# Patient Record
Sex: FEMALE | Race: BLACK OR AFRICAN AMERICAN | NOT HISPANIC OR LATINO | Employment: FULL TIME | URBAN - METROPOLITAN AREA
[De-identification: names, ages, dates, MRNs, and addresses within clinical notes are randomized per-mention and may not be internally consistent; named-entity substitution may affect disease eponyms.]

---

## 2018-03-12 ENCOUNTER — OFFICE VISIT (OUTPATIENT)
Dept: FAMILY MEDICINE | Facility: CLINIC | Age: 38
End: 2018-03-12
Payer: COMMERCIAL

## 2018-03-12 VITALS
TEMPERATURE: 99 F | OXYGEN SATURATION: 99 % | HEART RATE: 85 BPM | SYSTOLIC BLOOD PRESSURE: 132 MMHG | RESPIRATION RATE: 18 BRPM | DIASTOLIC BLOOD PRESSURE: 88 MMHG | BODY MASS INDEX: 47.09 KG/M2 | WEIGHT: 293 LBS | HEIGHT: 66 IN

## 2018-03-12 DIAGNOSIS — D50.9 IRON DEFICIENCY ANEMIA, UNSPECIFIED IRON DEFICIENCY ANEMIA TYPE: ICD-10-CM

## 2018-03-12 DIAGNOSIS — E66.01 MORBID OBESITY WITH BMI OF 60.0-69.9, ADULT: ICD-10-CM

## 2018-03-12 DIAGNOSIS — E88.810 INSULIN RESISTANCE SYNDROME: ICD-10-CM

## 2018-03-12 DIAGNOSIS — I10 ESSENTIAL HYPERTENSION: ICD-10-CM

## 2018-03-12 DIAGNOSIS — E55.9 VITAMIN D DEFICIENCY: ICD-10-CM

## 2018-03-12 PROCEDURE — 99999 PR PBB SHADOW E&M-EST. PATIENT-LVL III: CPT | Mod: PBBFAC,,, | Performed by: FAMILY MEDICINE

## 2018-03-12 PROCEDURE — 99214 OFFICE O/P EST MOD 30 MIN: CPT | Mod: S$GLB,,, | Performed by: FAMILY MEDICINE

## 2018-03-12 PROCEDURE — 3075F SYST BP GE 130 - 139MM HG: CPT | Mod: CPTII,S$GLB,, | Performed by: FAMILY MEDICINE

## 2018-03-12 PROCEDURE — 3079F DIAST BP 80-89 MM HG: CPT | Mod: CPTII,S$GLB,, | Performed by: FAMILY MEDICINE

## 2018-03-12 RX ORDER — LOSARTAN POTASSIUM AND HYDROCHLOROTHIAZIDE 12.5; 5 MG/1; MG/1
1 TABLET ORAL DAILY
Qty: 90 TABLET | Refills: 1 | Status: SHIPPED | OUTPATIENT
Start: 2018-03-12 | End: 2019-01-21 | Stop reason: SDUPTHER

## 2018-03-12 RX ORDER — CYCLOBENZAPRINE HCL 10 MG
10 TABLET ORAL NIGHTLY PRN
Qty: 30 TABLET | Refills: 1 | Status: SHIPPED | OUTPATIENT
Start: 2018-03-12 | End: 2019-01-21

## 2018-03-12 RX ORDER — CHOLECALCIFEROL (VITAMIN D3) 25 MCG
185 TABLET ORAL
COMMUNITY

## 2018-03-12 RX ORDER — MELOXICAM 15 MG/1
15 TABLET ORAL DAILY
Qty: 30 TABLET | Refills: 1 | Status: SHIPPED | OUTPATIENT
Start: 2018-03-12 | End: 2019-01-21

## 2018-03-12 NOTE — PROGRESS NOTES
Subjective:       Patient ID: Aggie Horton is a 37 y.o. female.    Chief Complaint: Chest Pain and Medication Refill    Ms. Horton comes in today to establish care with me.  She states she changes PCP from Dr. Stewart to me.  Her last visit with Dr. Stewart was on October 12, 2016.    She states she has not taken blood pressure medication since last month.  She does not exercise and does not monitor her diet.    She states she was unable to tolerate metformin XR due to severe diarrhea, nausea.  She also states Victoza was never given as will require prior authorization.  She states she never got to try low-dose Januvia.  She denies having known family history of thyroid cancer neoplastic syndrome.    She reports having intermittent palpitations for several months but reports more frequently over the past 2 weeks.  She consumes caffeine daily and denies use of over-the-counter sinus decongestant medication.  She denies having associated chest pain, leg swelling, shortness of breath, cough, wheezing, abdominal pain, nausea, vomiting, diarrhea, constipation, fever, chills, fatigue, change of appetite, unusual urinary symptoms, back pain, headaches, anxiety, depression, homicidal or suicidal thoughts.    She states for 1 week she has been experiencing left ankle pain with pain at her second and third toes stepping.  She denies associated trauma but states she has had similar symptoms in the past and was prescribed Mobic, Flexeril with help.  She also reports having occasional neck pain without associated trauma and reports having similar symptoms in the past.    She is not fasting today.    She does not desire flu shot.      Past Medical History:  No date: Hypertension  No date: Insulin resistance  No date: KELLY on CPAP    Current Outpatient Prescriptions:  FERROUS GLUCONATE ORAL, Take 325 mg by mouth.  vitamin D 1000 units Tab, Take 185 mg by mouth.  cyclobenzaprine (FLEXERIL) 10 MG tablet, Take 1  tablet (10 mg total) by mouth nightly as needed for Muscle spasms (not currently taking)..  liraglutide 0.6 mg/0.1 mL, 18 mg/3 mL, subq PNIJ 0.6 mg/0.1 mL (18 mg/3 mL) PnIj, Inject 1.2 mg into the skin once daily (not currently taking).  losartan-hydrochlorothiazide 50-12.5 mg (HYZAAR) 50-12.5 mg per tablet, Take 1 tablet by mouth once daily (not currently taking)..  meloxicam (MOBIC) 15 MG tablet, Take 1 tablet (15 mg total) by mouth once daily (not currently taking).            Chest Pain    Associated symptoms include headaches and palpitations. Pertinent negatives include no cough, fever, shortness of breath, vomiting or weakness.   Medication Refill   Associated symptoms include headaches, myalgias and neck pain. Pertinent negatives include no arthralgias, chest pain, chills, coughing, fatigue, fever, joint swelling, vomiting or weakness.     Review of Systems   Constitutional: Negative for activity change, appetite change, chills, fatigue, fever and unexpected weight change.   HENT: Negative for hearing loss, rhinorrhea and trouble swallowing.    Eyes: Negative for discharge and visual disturbance.   Respiratory: Negative for cough, chest tightness, shortness of breath and wheezing.    Cardiovascular: Positive for palpitations. Negative for chest pain and leg swelling.   Gastrointestinal: Negative for blood in stool, constipation, diarrhea and vomiting.   Endocrine: Negative for polydipsia and polyuria.   Genitourinary: Negative for difficulty urinating, dysuria, hematuria and menstrual problem.   Musculoskeletal: Positive for myalgias and neck pain. Negative for arthralgias and joint swelling.   Neurological: Positive for headaches. Negative for weakness.   Psychiatric/Behavioral: Negative for confusion, dysphoric mood and suicidal ideas. The patient is not nervous/anxious.        Objective:      Physical Exam   Constitutional: She is oriented to person, place, and time. She appears well-developed and  well-nourished. No distress.   Pleasant.   Neck: Normal range of motion. Neck supple. No thyromegaly present.   Cardiovascular: Normal rate, regular rhythm, normal heart sounds and intact distal pulses.    No murmur heard.  Pulmonary/Chest: Effort normal and breath sounds normal. No respiratory distress. She has no wheezes. She has no rales.   Abdominal: Soft. Bowel sounds are normal. She exhibits no distension and no mass. There is no tenderness. There is no rebound and no guarding.   Musculoskeletal: Normal range of motion. She exhibits no edema or tenderness.   She is ambulatory without problems.   Lymphadenopathy:     She has no cervical adenopathy.   Neurological: She is oriented to person, place, and time.   Skin: She is not diaphoretic.   Psychiatric: She has a normal mood and affect. Her behavior is normal. Judgment and thought content normal.   Vitals reviewed.      Assessment:       1. Essential hypertension    2. Iron deficiency anemia, unspecified iron deficiency anemia type    3. Vitamin D deficiency    4. Insulin resistance syndrome    5. Morbid obesity with BMI of 60.0-69.9, adult        Plan:       Labs: A1c, insulin, lipid panel, TSH, CMP, CBC, urinalysis, vitamin D, ferritin.  Patient advised to call for results/follow up recommendations.  Continue current medications, follow low-sodium, low-cholesterol, low carbohydrate diet, daily walks as tolerated.

## 2018-03-13 ENCOUNTER — TELEPHONE (OUTPATIENT)
Dept: FAMILY MEDICINE | Facility: CLINIC | Age: 38
End: 2018-03-13

## 2018-03-13 NOTE — TELEPHONE ENCOUNTER
----- Message from Ivette Singh sent at 3/13/2018  8:39 AM CDT -----  Contact: patient  States she need orders to have lab work done @[facility]. Please call patient ASAP @ [phone]. Thanks, sherman

## 2018-03-16 LAB
25(OH)D3+25(OH)D2 SERPL-MCNC: 34.7 NG/ML (ref 30–100)
ALBUMIN SERPL-MCNC: 4.2 G/DL (ref 3.5–5.5)
ALBUMIN/GLOB SERPL: 1.5 {RATIO} (ref 1.2–2.2)
ALP SERPL-CCNC: 66 IU/L (ref 39–117)
ALT SERPL-CCNC: 17 IU/L (ref 0–32)
APPEARANCE UR: CLEAR
AST SERPL-CCNC: 17 IU/L (ref 0–40)
BASOPHILS # BLD AUTO: 0.1 X10E3/UL (ref 0–0.2)
BASOPHILS NFR BLD AUTO: 1 %
BILIRUB SERPL-MCNC: 0.4 MG/DL (ref 0–1.2)
BILIRUB UR QL STRIP: NEGATIVE
BUN SERPL-MCNC: 12 MG/DL (ref 6–20)
BUN/CREAT SERPL: 15 (ref 9–23)
CALCIUM SERPL-MCNC: 9.7 MG/DL (ref 8.7–10.2)
CHLORIDE SERPL-SCNC: 98 MMOL/L (ref 96–106)
CHOLEST SERPL-MCNC: 184 MG/DL (ref 100–199)
CO2 SERPL-SCNC: 26 MMOL/L (ref 18–29)
COLOR UR: YELLOW
CREAT SERPL-MCNC: 0.82 MG/DL (ref 0.57–1)
EOSINOPHIL # BLD AUTO: 0.2 X10E3/UL (ref 0–0.4)
EOSINOPHIL NFR BLD AUTO: 2 %
ERYTHROCYTE [DISTWIDTH] IN BLOOD BY AUTOMATED COUNT: 15.4 % (ref 12.3–15.4)
FERRITIN SERPL-MCNC: 126 NG/ML (ref 15–150)
GFR SERPLBLD CREATININE-BSD FMLA CKD-EPI: 106 ML/MIN/1.73
GFR SERPLBLD CREATININE-BSD FMLA CKD-EPI: 92 ML/MIN/1.73
GLOBULIN SER CALC-MCNC: 2.8 G/DL (ref 1.5–4.5)
GLUCOSE SERPL-MCNC: 104 MG/DL (ref 65–99)
GLUCOSE UR QL: NEGATIVE
HBA1C MFR BLD: 5.8 % (ref 4.8–5.6)
HCT VFR BLD AUTO: 38.1 % (ref 34–46.6)
HDLC SERPL-MCNC: 50 MG/DL
HGB BLD-MCNC: 11.8 G/DL (ref 11.1–15.9)
HGB UR QL STRIP: NEGATIVE
IMM GRANULOCYTES # BLD: 0 X10E3/UL (ref 0–0.1)
IMM GRANULOCYTES NFR BLD: 0 %
INSULIN SERPL-ACNC: 70.8 UIU/ML (ref 2.6–24.9)
KETONES UR QL STRIP: NEGATIVE
LDLC SERPL CALC-MCNC: 112 MG/DL (ref 0–99)
LEUKOCYTE ESTERASE UR QL STRIP: NEGATIVE
LYMPHOCYTES # BLD AUTO: 3.3 X10E3/UL (ref 0.7–3.1)
LYMPHOCYTES NFR BLD AUTO: 32 %
MCH RBC QN AUTO: 24.5 PG (ref 26.6–33)
MCHC RBC AUTO-ENTMCNC: 31 G/DL (ref 31.5–35.7)
MCV RBC AUTO: 79 FL (ref 79–97)
MICRO URNS: NORMAL
MONOCYTES # BLD AUTO: 0.6 X10E3/UL (ref 0.1–0.9)
MONOCYTES NFR BLD AUTO: 6 %
NEUTROPHILS # BLD AUTO: 6.1 X10E3/UL (ref 1.4–7)
NEUTROPHILS NFR BLD AUTO: 59 %
NITRITE UR QL STRIP: NEGATIVE
PH UR STRIP: 6 [PH] (ref 5–7.5)
PLATELET # BLD AUTO: 425 X10E3/UL (ref 150–379)
POTASSIUM SERPL-SCNC: 4.5 MMOL/L (ref 3.5–5.2)
PROT SERPL-MCNC: 7 G/DL (ref 6–8.5)
PROT UR QL STRIP: NEGATIVE
RBC # BLD AUTO: 4.82 X10E6/UL (ref 3.77–5.28)
SODIUM SERPL-SCNC: 138 MMOL/L (ref 134–144)
SP GR UR: 1.03 (ref 1–1.03)
TRIGL SERPL-MCNC: 110 MG/DL (ref 0–149)
TSH SERPL DL<=0.005 MIU/L-ACNC: 1.73 UIU/ML (ref 0.45–4.5)
UROBILINOGEN UR STRIP-MCNC: 0.2 MG/DL (ref 0.2–1)
VLDLC SERPL CALC-MCNC: 22 MG/DL (ref 5–40)
WBC # BLD AUTO: 10.3 X10E3/UL (ref 3.4–10.8)

## 2018-03-19 ENCOUNTER — TELEPHONE (OUTPATIENT)
Dept: FAMILY MEDICINE | Facility: CLINIC | Age: 38
End: 2018-03-19

## 2018-03-19 PROBLEM — E66.01 MORBID OBESITY WITH BMI OF 60.0-69.9, ADULT: Status: ACTIVE | Noted: 2018-03-19

## 2018-03-19 PROBLEM — E55.9 VITAMIN D DEFICIENCY: Status: ACTIVE | Noted: 2018-03-19

## 2018-03-19 PROBLEM — E88.810 INSULIN RESISTANCE SYNDROME: Status: ACTIVE | Noted: 2018-03-19

## 2018-03-19 NOTE — TELEPHONE ENCOUNTER
----- Message from Talon Martell sent at 3/19/2018 10:40 AM CDT -----  Contact: self [phone]  Would like to consult regarding test results. Please call back at [phone].  Md Kassi

## 2018-03-20 RX ORDER — PEN NEEDLE, DIABETIC 30 GX3/16"
NEEDLE, DISPOSABLE MISCELLANEOUS
Qty: 100 EACH | Refills: 3 | Status: SHIPPED | OUTPATIENT
Start: 2018-03-20 | End: 2021-03-23

## 2018-03-20 NOTE — TELEPHONE ENCOUNTER
I spoke w/pt - discussed either Victoza or Januvia, but of course, will need insurance approval.  Patient okay with Victoza.  I advised she will need to see me in 3 months after starting Victoza.  She verbalized understanding.

## 2018-03-20 NOTE — TELEPHONE ENCOUNTER
----- Message from Yanci Starks sent at 3/20/2018  9:20 AM CDT -----  Contact: pt  The pt request a call concerning her lab results, the pt can be reached at [phone]///[id number]

## 2018-03-26 ENCOUNTER — PATIENT MESSAGE (OUTPATIENT)
Dept: FAMILY MEDICINE | Facility: CLINIC | Age: 38
End: 2018-03-26

## 2018-06-29 ENCOUNTER — PATIENT OUTREACH (OUTPATIENT)
Dept: ADMINISTRATIVE | Facility: HOSPITAL | Age: 38
End: 2018-06-29

## 2018-08-13 NOTE — TELEPHONE ENCOUNTER
Appt made for F/u. Pt voiced understanding  
LOV:03/12/18  
Needs diabetes f/u visit w/me for June 2018.  
No answer. Lm to rtc  
No answer. Lm to rtc  
1-2 drinks

## 2019-01-21 ENCOUNTER — OFFICE VISIT (OUTPATIENT)
Dept: FAMILY MEDICINE | Facility: CLINIC | Age: 39
End: 2019-01-21
Payer: COMMERCIAL

## 2019-01-21 VITALS
WEIGHT: 293 LBS | DIASTOLIC BLOOD PRESSURE: 69 MMHG | HEART RATE: 88 BPM | SYSTOLIC BLOOD PRESSURE: 139 MMHG | TEMPERATURE: 99 F | BODY MASS INDEX: 47.09 KG/M2 | HEIGHT: 66 IN | OXYGEN SATURATION: 100 %

## 2019-01-21 DIAGNOSIS — E66.01 MORBID OBESITY WITH BMI OF 50.0-59.9, ADULT: ICD-10-CM

## 2019-01-21 DIAGNOSIS — D50.9 IRON DEFICIENCY ANEMIA, UNSPECIFIED IRON DEFICIENCY ANEMIA TYPE: ICD-10-CM

## 2019-01-21 DIAGNOSIS — M25.561 CHRONIC PAIN OF RIGHT KNEE: ICD-10-CM

## 2019-01-21 DIAGNOSIS — I10 ESSENTIAL HYPERTENSION: ICD-10-CM

## 2019-01-21 DIAGNOSIS — Z00.00 ANNUAL PHYSICAL EXAM: Primary | ICD-10-CM

## 2019-01-21 DIAGNOSIS — L81.9 HYPERPIGMENTATION OF SKIN: ICD-10-CM

## 2019-01-21 DIAGNOSIS — G89.29 CHRONIC PAIN OF RIGHT KNEE: ICD-10-CM

## 2019-01-21 DIAGNOSIS — E88.810 INSULIN RESISTANCE SYNDROME: ICD-10-CM

## 2019-01-21 DIAGNOSIS — E55.9 VITAMIN D DEFICIENCY: ICD-10-CM

## 2019-01-21 PROCEDURE — 99395 PR PREVENTIVE VISIT,EST,18-39: ICD-10-PCS | Mod: S$GLB,,, | Performed by: FAMILY MEDICINE

## 2019-01-21 PROCEDURE — 99999 PR PBB SHADOW E&M-EST. PATIENT-LVL IV: CPT | Mod: PBBFAC,,, | Performed by: FAMILY MEDICINE

## 2019-01-21 PROCEDURE — 3075F PR MOST RECENT SYSTOLIC BLOOD PRESS GE 130-139MM HG: ICD-10-PCS | Mod: CPTII,S$GLB,, | Performed by: FAMILY MEDICINE

## 2019-01-21 PROCEDURE — 3075F SYST BP GE 130 - 139MM HG: CPT | Mod: CPTII,S$GLB,, | Performed by: FAMILY MEDICINE

## 2019-01-21 PROCEDURE — 99999 PR PBB SHADOW E&M-EST. PATIENT-LVL IV: ICD-10-PCS | Mod: PBBFAC,,, | Performed by: FAMILY MEDICINE

## 2019-01-21 PROCEDURE — 3078F PR MOST RECENT DIASTOLIC BLOOD PRESSURE < 80 MM HG: ICD-10-PCS | Mod: CPTII,S$GLB,, | Performed by: FAMILY MEDICINE

## 2019-01-21 PROCEDURE — 3078F DIAST BP <80 MM HG: CPT | Mod: CPTII,S$GLB,, | Performed by: FAMILY MEDICINE

## 2019-01-21 PROCEDURE — 99395 PREV VISIT EST AGE 18-39: CPT | Mod: S$GLB,,, | Performed by: FAMILY MEDICINE

## 2019-01-21 RX ORDER — FERROUS SULFATE 325(65) MG
325 TABLET ORAL
COMMUNITY

## 2019-01-21 RX ORDER — LOSARTAN POTASSIUM AND HYDROCHLOROTHIAZIDE 12.5; 5 MG/1; MG/1
1 TABLET ORAL DAILY
Qty: 90 TABLET | Refills: 1 | Status: SHIPPED | OUTPATIENT
Start: 2019-01-21 | End: 2019-07-18 | Stop reason: SDUPTHER

## 2019-01-21 NOTE — PROGRESS NOTES
"HISTORY OF PRESENT ILLNESS:  Ms. Horton comes in today nonfasting and without taken medication for annual wellness examination.     END OF LIFE DECISION: She does not have a living will.   She does desire life support.    Current Outpatient Medications   Medication Sig    ferrous sulfate (IRON) 325 mg (65 mg iron) Tab tablet Take 325 mg by mouth daily with breakfast.    losartan-hydrochlorothiazide 50-12.5 mg (HYZAAR) 50-12.5 mg per tablet Take 1 tablet by mouth once daily.    vitamin D 1000 units Tab Take 185 mg by mouth.    liraglutide 0.6 mg/0.1 mL, 18 mg/3 mL, subq PNIJ 0.6 mg/0.1 mL (18 mg/3 mL) PnIj Inject 1.2 mg into the skin once daily. (not taking as no refill since December 2018)    pen needle, diabetic (BD ULTRA-FINE WILL PEN NEEDLES) 32 gauge x 5/32" Ndle Use daily     SCREENINGS:    Lab Results   Component Value Date    LDLCALC 112 (H) 03/15/2018      Colonoscopy 06/02/2015    Pap Smear with HPV Cotest 08/03/2018 with GYN Dr. Cadence Hernandez   Mammogram: (at age 36) - okay.  Eye Exam: Years ago per patient.  PPD: [place].      Immunizations:    Tdap: > 10 years ago per patient. She declines.  Flu shot: In the past.  She declines.  Pneumovax: Never. She declines.                                  ROS:  GENERAL: Denies fever, chills, fatigue or unusual weight change. Appetite normal. Exercises does not. Monitors diet does not.  SKIN: Denies rashes, itching, changes in mole, color or texture of skin or easy bruising.  HEAD:  Denies headaches or recent head trauma.  EYES: Denies change in vision, pain, diplopia, redness or discharge.  EARS: Denies ear pain, discharge, vertigo or decreased hearing.  NOSE: Denies loss of smell, epistaxis or rhinitis.  MOUTH & THROAT: Denies hoarseness or change in voice. Denies excessive gum bleeding or mouth sores.  Denies sore throat.  NODES: Denies swollen glands.  CHEST: Denies GALLAGHER, wheezing, cough, or sputum production.  CARDIOVASCULAR: Denies chest pain, PND, orthopnea " "or reduced exercise tolerance.  Denies palpitations.  ABDOMEN: Denies diarrhea, constipation, nausea, vomiting, abdominal pain, or blood in stool.  URINARY: Denies flank pain, dysuria or hematuria.  GENITOURINARY: Denies flank pain, dysuria, frequency or hematuria. No change in menses. Performs monthly breast exams does.  ENDOCRINE: Denies diabetes, thyroid, cholesterol problems but reports history of insulin resistance. Performs home glucose checks does not.  HEME/LYMPH: Denies bleeding problems.  PERIPHERAL VASCULAR:Denies claudication or cyanosis.  MUSCULOSKELETAL: Denies joint stiffness, pain or swelling. Denies edema. Except reports non-traumatic right knee pain, throbbing x 3 months without swelling but with constant discomfort x 2-1/2 months; takes OTC Ibuprofen and topical rubs without help; reports 6/10 at pain scale now. Ambulatory with pain but reports can't kneel on it.  NEUROLOGIC: Denies history of seizures, tremors, paralysis, alteration of gait or coordination.  PSYCHIATRIC: Denies mood swings, depression anxiety, homicidal or suicidal thoughts. Denies sleep problems.    PE:   VS: /69 (BP Location: Left arm, Patient Position: Sitting, BP Method: Large (Manual))   Pulse 88   Temp 98.8 °F (37.1 °C) (Tympanic)   Ht 5' 6" (1.676 m)   Wt (!) 168.2 kg (370 lb 14.8 oz)   LMP 01/04/2019 Comment: Monthly  SpO2 100%   BMI 59.87 kg/m²   APPEARANCE:  Well nourished, well developed female, pleasant and obese, alert and oriented in no acute distress.    HEAD: Nontender. Full range of motion.  EYES: PERRL, conjunctiva pink, lids no edema.   EARS: External canal patent with wax noted. No swelling or redness. TM's shiny and clear.  NOSE: Mucosa and turbinates pink, not swollen. No discharge. Nontender sinuses.  THROAT: No pharyngeal erythema or exudate. No stridor.   NECK: Supple, no mass, thyroid not enlarged.  NODES: No cervical lymph node enlargement.  CHEST: Normal respiratory effort. Lungs clear to " auscultation.  CARDIOVASCULAR: Normal S1, S2. No rubs, murmurs or gallops. PMI not displaced. No carotid bruit. Pedal pulses palpable bilaterally. No edema.  ABDOMEN: Bowel sounds present. Not distended. Soft. No tenderness, masses or organomegaly.  BREAST EXAM: Not examined.  PELVIC EXAM: Not examined.  RECTAL EXAM: Not examined.  MUSCULOSKELETAL: No joint deformities or stiffness. Non tender right knee today.  She is ambulatory without problems.  SKIN: No rashes or suspicious lesions, normal color and turgor except vertical darkened skin at back with dry patches.  NEUROLOGIC:   Cranial Nerves: II-XII grossly intact.   DTR's: Knees, Ankles 2+ and equal bilaterally. Gait & Posture: Normal gait and fine motion.  PSYCHIATRIC:Patient alert, oriented x 3. Mood/Affect normal without acute anxiety or depression noted. Judgment/insight good as she makes appropriate decisions during today's exam.     ASSESSMENT:    ICD-10-CM ICD-9-CM    1. Annual physical exam Z00.00 V70.0 Vitamin D      Vitamin D      Ferritin      Ferritin      Urinalysis      Urinalysis      Lipid panel      Lipid panel      Comprehensive metabolic panel      Comprehensive metabolic panel      TSH      TSH      CBC auto differential      CBC auto differential      Insulin, random      Insulin, random      Hemoglobin A1c      Hemoglobin A1c      CANCELED: Hemoglobin A1c      CANCELED: Insulin, random      CANCELED: CBC auto differential      CANCELED: TSH      CANCELED: Comprehensive metabolic panel      CANCELED: Lipid panel      CANCELED: Urinalysis      CANCELED: Ferritin      CANCELED: Vitamin D   2. Essential hypertension I10 401.9 losartan-hydrochlorothiazide 50-12.5 mg (HYZAAR) 50-12.5 mg per tablet   3. Insulin resistance syndrome E88.81 277.7 liraglutide 0.6 mg/0.1 mL, 18 mg/3 mL, subq PNIJ (VICTOZA 2-BLAYNE) 0.6 mg/0.1 mL (18 mg/3 mL) PnIj   4. Iron deficiency anemia, unspecified iron deficiency anemia type D50.9 280.9    5. Vitamin D deficiency  E55.9 268.9    6. Chronic pain of right knee M25.561 719.46 X-Ray Knee 1 or 2 View Right    G89.29 338.29 X-Ray Knee 1 or 2 View Right   7. Morbid obesity with BMI of 50.0-59.9, adult E66.01 278.01     Z68.43 V85.43    8. Hyperpigmentation of skin L81.9 709.00      PLAN:  1. Age-appropriate counseling-appropriate low-sodium, low-cholesterol, low carbohydrate diet and exercise daily, monthly breast self exam, annual wellness examination.   2. Patient advised to call for results.  3. Continue current medications.  4. Prescription refills as noted above.  5. Advised to use cream as directed for skin discoloration  6. Keep follow up with specialists.  7. Follow-up in about 6 months (around 7/22/2019) for hypertension and IRS follow up.     Addendum: As noted above can use the following for treatment of skin discoloration - topical retinoid 0.05% cream - apply daily along with Lac-Hydrin 12% cream -apply twice daily x few months to see if helps. Medication precautions discussed with patient.    Answers for HPI/ROS submitted by the patient on 1/19/2019   Hypertension  Chronicity: recurrent  Onset: more than 1 year ago  Progression since onset: waxing and waning  Condition status: resistant  anxiety: No  blurred vision: No  chest pain: No  headaches: Yes  malaise/fatigue: Yes  neck pain: No  orthopnea: No  palpitations: Yes  peripheral edema: No  PND: Yes  shortness of breath: Yes  sweats: Yes  Agents associated with hypertension: NSAIDs  CAD risks: obesity  Compliance problems: diet, exercise  Past treatments: nothing  Improvement on treatment: mild

## 2019-01-28 PROBLEM — G89.29 CHRONIC PAIN OF RIGHT KNEE: Status: ACTIVE | Noted: 2019-01-28

## 2019-01-28 PROBLEM — L81.9 HYPERPIGMENTATION OF SKIN: Status: ACTIVE | Noted: 2019-01-28

## 2019-01-28 PROBLEM — M25.561 CHRONIC PAIN OF RIGHT KNEE: Status: ACTIVE | Noted: 2019-01-28

## 2019-01-29 ENCOUNTER — PATIENT MESSAGE (OUTPATIENT)
Dept: FAMILY MEDICINE | Facility: CLINIC | Age: 39
End: 2019-01-29

## 2019-01-29 DIAGNOSIS — L81.9 HYPERPIGMENTATION OF SKIN: Primary | ICD-10-CM

## 2019-01-29 LAB
25(OH)D3+25(OH)D2 SERPL-MCNC: 30.1 NG/ML (ref 30–100)
ALBUMIN SERPL-MCNC: 3.8 G/DL (ref 3.5–5.5)
ALBUMIN/GLOB SERPL: 1.2 {RATIO} (ref 1.2–2.2)
ALP SERPL-CCNC: 73 IU/L (ref 39–117)
ALT SERPL-CCNC: 14 IU/L (ref 0–32)
APPEARANCE UR: CLEAR
AST SERPL-CCNC: 13 IU/L (ref 0–40)
BASOPHILS # BLD AUTO: 0.1 X10E3/UL (ref 0–0.2)
BASOPHILS NFR BLD AUTO: 1 %
BILIRUB SERPL-MCNC: <0.2 MG/DL (ref 0–1.2)
BILIRUB UR QL STRIP: NEGATIVE
BUN SERPL-MCNC: 10 MG/DL (ref 6–20)
BUN/CREAT SERPL: 11 (ref 9–23)
CALCIUM SERPL-MCNC: 9.7 MG/DL (ref 8.7–10.2)
CHLORIDE SERPL-SCNC: 98 MMOL/L (ref 96–106)
CHOLEST SERPL-MCNC: 171 MG/DL (ref 100–199)
CO2 SERPL-SCNC: 25 MMOL/L (ref 20–29)
COLOR UR: YELLOW
CREAT SERPL-MCNC: 0.89 MG/DL (ref 0.57–1)
EOSINOPHIL # BLD AUTO: 0.2 X10E3/UL (ref 0–0.4)
EOSINOPHIL NFR BLD AUTO: 2 %
ERYTHROCYTE [DISTWIDTH] IN BLOOD BY AUTOMATED COUNT: 15.2 % (ref 12.3–15.4)
FERRITIN SERPL-MCNC: 121 NG/ML (ref 15–150)
GLOBULIN SER CALC-MCNC: 3.1 G/DL (ref 1.5–4.5)
GLUCOSE SERPL-MCNC: 105 MG/DL (ref 65–99)
GLUCOSE UR QL: NEGATIVE
HBA1C MFR BLD: 5.9 % (ref 4.8–5.6)
HCT VFR BLD AUTO: 36.6 % (ref 34–46.6)
HDLC SERPL-MCNC: 52 MG/DL
HGB BLD-MCNC: 11.6 G/DL (ref 11.1–15.9)
HGB UR QL STRIP: NEGATIVE
IMM GRANULOCYTES # BLD AUTO: 0 X10E3/UL (ref 0–0.1)
IMM GRANULOCYTES NFR BLD AUTO: 0 %
INSULIN SERPL-ACNC: 74.2 UIU/ML (ref 2.6–24.9)
KETONES UR QL STRIP: NEGATIVE
LDLC SERPL CALC-MCNC: 90 MG/DL (ref 0–99)
LEUKOCYTE ESTERASE UR QL STRIP: NEGATIVE
LYMPHOCYTES # BLD AUTO: 3.5 X10E3/UL (ref 0.7–3.1)
LYMPHOCYTES NFR BLD AUTO: 32 %
MCH RBC QN AUTO: 25.3 PG (ref 26.6–33)
MCHC RBC AUTO-ENTMCNC: 31.7 G/DL (ref 31.5–35.7)
MCV RBC AUTO: 80 FL (ref 79–97)
MICRO URNS: NORMAL
MONOCYTES # BLD AUTO: 0.8 X10E3/UL (ref 0.1–0.9)
MONOCYTES NFR BLD AUTO: 8 %
NEUTROPHILS # BLD AUTO: 6.5 X10E3/UL (ref 1.4–7)
NEUTROPHILS NFR BLD AUTO: 57 %
NITRITE UR QL STRIP: NEGATIVE
PH UR STRIP: 6 [PH] (ref 5–7.5)
PLATELET # BLD AUTO: 437 X10E3/UL (ref 150–379)
POTASSIUM SERPL-SCNC: 4.2 MMOL/L (ref 3.5–5.2)
PROT SERPL-MCNC: 6.9 G/DL (ref 6–8.5)
PROT UR QL STRIP: NEGATIVE
RBC # BLD AUTO: 4.59 X10E6/UL (ref 3.77–5.28)
SODIUM SERPL-SCNC: 139 MMOL/L (ref 134–144)
SP GR UR: 1.01 (ref 1–1.03)
TRIGL SERPL-MCNC: 143 MG/DL (ref 0–149)
TSH SERPL DL<=0.005 MIU/L-ACNC: 2.23 UIU/ML (ref 0.45–4.5)
UROBILINOGEN UR STRIP-MCNC: 0.2 MG/DL (ref 0.2–1)
VLDLC SERPL CALC-MCNC: 29 MG/DL (ref 5–40)
WBC # BLD AUTO: 11.1 X10E3/UL (ref 3.4–10.8)

## 2019-01-29 RX ORDER — AMMONIUM LACTATE 12 G/100G
CREAM TOPICAL
Qty: 385 G | Refills: 0 | Status: SHIPPED | OUTPATIENT
Start: 2019-01-29 | End: 2022-02-22 | Stop reason: SDUPTHER

## 2019-01-29 RX ORDER — TRETINOIN 0.5 MG/G
CREAM TOPICAL NIGHTLY
Qty: 45 G | Refills: 0 | Status: SHIPPED | OUTPATIENT
Start: 2019-01-29 | End: 2019-07-18

## 2019-01-30 ENCOUNTER — TELEPHONE (OUTPATIENT)
Dept: FAMILY MEDICINE | Facility: CLINIC | Age: 39
End: 2019-01-30

## 2019-07-18 ENCOUNTER — OFFICE VISIT (OUTPATIENT)
Dept: FAMILY MEDICINE | Facility: CLINIC | Age: 39
End: 2019-07-18
Payer: COMMERCIAL

## 2019-07-18 VITALS
HEART RATE: 90 BPM | SYSTOLIC BLOOD PRESSURE: 136 MMHG | BODY MASS INDEX: 59.35 KG/M2 | OXYGEN SATURATION: 100 % | DIASTOLIC BLOOD PRESSURE: 82 MMHG | RESPIRATION RATE: 19 BRPM | TEMPERATURE: 97 F | WEIGHT: 293 LBS

## 2019-07-18 DIAGNOSIS — E66.01 MORBID OBESITY WITH BMI OF 50.0-59.9, ADULT: ICD-10-CM

## 2019-07-18 DIAGNOSIS — E88.810 INSULIN RESISTANCE SYNDROME: ICD-10-CM

## 2019-07-18 DIAGNOSIS — D72.829 LEUKOCYTOSIS, UNSPECIFIED TYPE: ICD-10-CM

## 2019-07-18 DIAGNOSIS — M79.671 PAIN OF RIGHT HEEL: ICD-10-CM

## 2019-07-18 DIAGNOSIS — R73.03 PREDIABETES: ICD-10-CM

## 2019-07-18 DIAGNOSIS — D50.9 IRON DEFICIENCY ANEMIA, UNSPECIFIED IRON DEFICIENCY ANEMIA TYPE: ICD-10-CM

## 2019-07-18 DIAGNOSIS — I10 ESSENTIAL HYPERTENSION: ICD-10-CM

## 2019-07-18 PROCEDURE — 3079F PR MOST RECENT DIASTOLIC BLOOD PRESSURE 80-89 MM HG: ICD-10-PCS | Mod: CPTII,S$GLB,, | Performed by: FAMILY MEDICINE

## 2019-07-18 PROCEDURE — 99999 PR PBB SHADOW E&M-EST. PATIENT-LVL III: ICD-10-PCS | Mod: PBBFAC,,, | Performed by: FAMILY MEDICINE

## 2019-07-18 PROCEDURE — 99214 PR OFFICE/OUTPT VISIT, EST, LEVL IV, 30-39 MIN: ICD-10-PCS | Mod: S$GLB,,, | Performed by: FAMILY MEDICINE

## 2019-07-18 PROCEDURE — 3075F SYST BP GE 130 - 139MM HG: CPT | Mod: CPTII,S$GLB,, | Performed by: FAMILY MEDICINE

## 2019-07-18 PROCEDURE — 3075F PR MOST RECENT SYSTOLIC BLOOD PRESS GE 130-139MM HG: ICD-10-PCS | Mod: CPTII,S$GLB,, | Performed by: FAMILY MEDICINE

## 2019-07-18 PROCEDURE — 3079F DIAST BP 80-89 MM HG: CPT | Mod: CPTII,S$GLB,, | Performed by: FAMILY MEDICINE

## 2019-07-18 PROCEDURE — 3008F PR BODY MASS INDEX (BMI) DOCUMENTED: ICD-10-PCS | Mod: CPTII,S$GLB,, | Performed by: FAMILY MEDICINE

## 2019-07-18 PROCEDURE — 3008F BODY MASS INDEX DOCD: CPT | Mod: CPTII,S$GLB,, | Performed by: FAMILY MEDICINE

## 2019-07-18 PROCEDURE — 99214 OFFICE O/P EST MOD 30 MIN: CPT | Mod: S$GLB,,, | Performed by: FAMILY MEDICINE

## 2019-07-18 PROCEDURE — 99999 PR PBB SHADOW E&M-EST. PATIENT-LVL III: CPT | Mod: PBBFAC,,, | Performed by: FAMILY MEDICINE

## 2019-07-18 RX ORDER — LOSARTAN POTASSIUM AND HYDROCHLOROTHIAZIDE 12.5; 5 MG/1; MG/1
1 TABLET ORAL DAILY
Qty: 90 TABLET | Refills: 1 | Status: SHIPPED | OUTPATIENT
Start: 2019-07-18 | End: 2021-03-23 | Stop reason: ALTCHOICE

## 2019-07-18 NOTE — PROGRESS NOTES
Aggie Horton    Chief Complaint   Patient presents with    Hypertension    Insulin Resistance    Follow-up       History of Present Illness:   Ms. Horton comes in today for 6-month hypertension and insulin resistance follow-up.  She is fasting and has not taken medications today.  She monitors her diet and states she has increased her exercises to 3 times per week by walking.    She states she noticed with increase in walking she has pain at her right ankle and at the bottom of her right heel intermittently for 2 but also states she continues to have right knee pain due to osteoarthritis.  She denies associated swelling or trauma.  She states she takes Tylenol and ibuprofen which temporarily eases pain.    She states she took Victoza for treatment of diabetes from January through March 2019 until her insurance stopped covering Victoza in April.  She states she has taken metformin in the past but is intolerant to it due to GI effects.  She does not perform glucose checks.    Otherwise, she states she feels okay today.  She denies having fever, chills, fatigue, appetite changes; shortness of breath, cough, wheezing; chest pain, palpitations, leg swelling; abdominal pain, nausea, vomiting, diarrhea, constipation; unusual urinary symptoms; polydipsia, polyuria, polyphagia; high or cold intolerance; back pain; headache; anxiety, depression, homicidal or suicidal thoughts.    She has no history of peptic ulcer disease or GERD.      Labs:                  WBC                      11.1 (H)            01/28/2019                 HGB                      11.6                01/28/2019                 HCT                      36.6                01/28/2019                 PLT                      437 (H)             01/28/2019                 CHOL                     171                 01/28/2019                 TRIG                     143                 01/28/2019                 HDL                      52                   01/28/2019                 ALT                      14                  01/28/2019                 AST                      13                  01/28/2019                 NA                       139                 01/28/2019                 K                        4.2                 01/28/2019                 CL                       98                  01/28/2019                 CREATININE               0.89                01/28/2019                 BUN                      10                  01/28/2019                 CO2                      25                  01/28/2019                 TSH                      2.230               01/28/2019                 HGBA1C                   5.9 (H)             01/28/2019            LDLCALC                  90                  01/28/2019          Insulin                             74.2           01/28/2019       Vit D, 25-Hydroxy                 30.1        01/28/2019        FERRITIN                 121                 01/28/2019              Hypertension   This is a recurrent problem. The current episode started more than 1 year ago. The problem has been waxing and waning since onset. The problem is resistant. Associated symptoms include malaise/fatigue. Pertinent negatives include no anxiety, blurred vision, chest pain, headaches, neck pain, orthopnea, palpitations, peripheral edema, PND, shortness of breath or sweats. Agents associated with hypertension include amphetamines. Risk factors for coronary artery disease include dyslipidemia, family history, obesity and smoking/tobacco exposure. The current treatment provides mild improvement. Compliance problems include diet and exercise.        Current Outpatient Medications   Medication Sig    ammonium lactate 12 % Crea Apply topically twice daily    ferrous sulfate (IRON) 325 mg (65 mg iron) Tab tablet Take 325 mg by mouth daily with breakfast.    losartan-hydrochlorothiazide 50-12.5 mg (HYZAAR)  "50-12.5 mg per tablet Take 1 tablet by mouth once daily.    pen needle, diabetic (BD ULTRA-FINE WILL PEN NEEDLES) 32 gauge x 5/32" Ndle Use daily    vitamin D 1000 units Tab Take 185 mg by mouth.    liraglutide 0.6 mg/0.1 mL, 18 mg/3 mL, subq PNIJ (VICTOZA 2-BLAYNE) 0.6 mg/0.1 mL (18 mg/3 mL) PnIj Inject 1.2 mg into the skin once daily.       Review of Systems   Constitutional: Positive for activity change and malaise/fatigue. Negative for appetite change, chills, fatigue and fever.        Weight 168.2 kg (370 lb 14.8 oz) at January 21, 2019 visit.   Eyes: Negative for blurred vision.   Respiratory: Negative for cough, shortness of breath and wheezing.    Cardiovascular: Negative for chest pain, palpitations, orthopnea, leg swelling and PND.   Gastrointestinal: Negative for abdominal pain, constipation, diarrhea, nausea and vomiting.   Endocrine: Negative for polydipsia, polyphagia and polyuria.        See history of present illness.   Genitourinary: Negative for difficulty urinating and menstrual problem.   Musculoskeletal: Positive for myalgias. Negative for arthralgias, back pain, joint swelling and neck pain.   Neurological: Negative for headaches.   Psychiatric/Behavioral: Negative for dysphoric mood and suicidal ideas. The patient is not nervous/anxious.         Negative for homicidal ideas.       Objective:  Physical Exam   Constitutional: She is oriented to person, place, and time. She appears well-developed and well-nourished. No distress.   Pleasant.   Neck: Normal range of motion. Neck supple. No thyromegaly present.   Cardiovascular: Normal rate, regular rhythm, normal heart sounds and intact distal pulses.   No murmur heard.  Pulmonary/Chest: Effort normal and breath sounds normal. No respiratory distress. She has no wheezes. She has no rales.   Abdominal: Soft. Bowel sounds are normal. She exhibits no distension and no mass. There is no tenderness. There is no rebound and no guarding. "   Musculoskeletal: Normal range of motion. She exhibits tenderness. She exhibits no edema.   She is ambulatory without problems. Tender at bottom of right heel without swelling and with full range of motion noted. Non tender ankle without swelling and with full range of motion noted.   Lymphadenopathy:     She has no cervical adenopathy.   Neurological: She is oriented to person, place, and time.   Skin: She is not diaphoretic.   Psychiatric: She has a normal mood and affect. Her behavior is normal. Judgment and thought content normal.   Vitals reviewed.      ASSESSMENT:  1. Essential hypertension    2. Insulin resistance syndrome    3. Prediabetes    4. Leukocytosis, unspecified type    5. Iron deficiency anemia, unspecified iron deficiency anemia type    6. Pain of right heel    7. Morbid obesity with BMI of 50.0-59.9, adult        PLAN:  Aggie was seen today for hypertension, insulin resistance and follow-up.    Diagnoses and all orders for this visit:    Essential hypertension  -     losartan-hydrochlorothiazide 50-12.5 mg (HYZAAR) 50-12.5 mg per tablet; Take 1 tablet by mouth once daily.  -     Comprehensive metabolic panel    Insulin resistance syndrome  -     liraglutide 0.6 mg/0.1 mL, 18 mg/3 mL, subq PNIJ (VICTOZA 2-BLAYNE) 0.6 mg/0.1 mL (18 mg/3 mL) PnIj; Inject 1.2 mg into the skin once daily.  -     Hemoglobin A1c  -     Insulin, random    Prediabetes  -     Hemoglobin A1c    Leukocytosis, unspecified type  -     CBC auto differential    Iron deficiency anemia, unspecified iron deficiency anemia type    Pain of right heel    Morbid obesity with BMI of 50.0-59.9, adult       Patient advised to call for results.  Continue current medications, follow low sodium, low cholesterol, low carb diet, daily walks.  Prescription refills as noted above.  Encouraged patient to change walking shoes periodically and wear insoles. Elevate feet prn. Take OTC NSAID with food x 1 week as directed. Consider x-ray if no  improvement or worsening symptoms noted.  Flu shot this fall.  Follow up in about 6 months (around 1/21/2020) for physical.

## 2019-07-19 LAB
ALBUMIN SERPL-MCNC: 4.1 G/DL (ref 3.5–5.5)
ALBUMIN/GLOB SERPL: 1.5 {RATIO} (ref 1.2–2.2)
ALP SERPL-CCNC: 73 IU/L (ref 39–117)
ALT SERPL-CCNC: 19 IU/L (ref 0–32)
AST SERPL-CCNC: 19 IU/L (ref 0–40)
BASOPHILS # BLD AUTO: 0 X10E3/UL (ref 0–0.2)
BASOPHILS NFR BLD AUTO: 0 %
BILIRUB SERPL-MCNC: 0.2 MG/DL (ref 0–1.2)
BUN SERPL-MCNC: 11 MG/DL (ref 6–20)
BUN/CREAT SERPL: 13 (ref 9–23)
CALCIUM SERPL-MCNC: 9.4 MG/DL (ref 8.7–10.2)
CHLORIDE SERPL-SCNC: 102 MMOL/L (ref 96–106)
CO2 SERPL-SCNC: 26 MMOL/L (ref 20–29)
CREAT SERPL-MCNC: 0.87 MG/DL (ref 0.57–1)
EOSINOPHIL # BLD AUTO: 0.2 X10E3/UL (ref 0–0.4)
EOSINOPHIL NFR BLD AUTO: 2 %
ERYTHROCYTE [DISTWIDTH] IN BLOOD BY AUTOMATED COUNT: 15.7 % (ref 12.3–15.4)
GLOBULIN SER CALC-MCNC: 2.7 G/DL (ref 1.5–4.5)
GLUCOSE SERPL-MCNC: 95 MG/DL (ref 65–99)
HBA1C MFR BLD: 5.8 % (ref 4.8–5.6)
HCT VFR BLD AUTO: 34.7 % (ref 34–46.6)
HGB BLD-MCNC: 11.1 G/DL (ref 11.1–15.9)
IMM GRANULOCYTES # BLD AUTO: 0 X10E3/UL (ref 0–0.1)
IMM GRANULOCYTES NFR BLD AUTO: 0 %
INSULIN SERPL-ACNC: 38.3 UIU/ML (ref 2.6–24.9)
LYMPHOCYTES # BLD AUTO: 2.7 X10E3/UL (ref 0.7–3.1)
LYMPHOCYTES NFR BLD AUTO: 30 %
MCH RBC QN AUTO: 25.1 PG (ref 26.6–33)
MCHC RBC AUTO-ENTMCNC: 32 G/DL (ref 31.5–35.7)
MCV RBC AUTO: 78 FL (ref 79–97)
MONOCYTES # BLD AUTO: 0.5 X10E3/UL (ref 0.1–0.9)
MONOCYTES NFR BLD AUTO: 6 %
NEUTROPHILS # BLD AUTO: 5.7 X10E3/UL (ref 1.4–7)
NEUTROPHILS NFR BLD AUTO: 62 %
PLATELET # BLD AUTO: 419 X10E3/UL (ref 150–450)
POTASSIUM SERPL-SCNC: 4.5 MMOL/L (ref 3.5–5.2)
PROT SERPL-MCNC: 6.8 G/DL (ref 6–8.5)
RBC # BLD AUTO: 4.43 X10E6/UL (ref 3.77–5.28)
SODIUM SERPL-SCNC: 142 MMOL/L (ref 134–144)
WBC # BLD AUTO: 9.2 X10E3/UL (ref 3.4–10.8)

## 2019-07-23 ENCOUNTER — PATIENT MESSAGE (OUTPATIENT)
Dept: FAMILY MEDICINE | Facility: CLINIC | Age: 39
End: 2019-07-23

## 2019-09-20 ENCOUNTER — OFFICE VISIT (OUTPATIENT)
Dept: FAMILY MEDICINE | Facility: CLINIC | Age: 39
End: 2019-09-20
Payer: COMMERCIAL

## 2019-09-20 VITALS — HEART RATE: 102 BPM | SYSTOLIC BLOOD PRESSURE: 171 MMHG | DIASTOLIC BLOOD PRESSURE: 100 MMHG | TEMPERATURE: 98 F

## 2019-09-20 DIAGNOSIS — H66.002 ACUTE SUPPURATIVE OTITIS MEDIA OF LEFT EAR WITHOUT SPONTANEOUS RUPTURE OF TYMPANIC MEMBRANE, RECURRENCE NOT SPECIFIED: Primary | ICD-10-CM

## 2019-09-20 DIAGNOSIS — J02.9 SORE THROAT: ICD-10-CM

## 2019-09-20 DIAGNOSIS — I10 ESSENTIAL HYPERTENSION: ICD-10-CM

## 2019-09-20 DIAGNOSIS — J32.9 SINUSITIS, UNSPECIFIED CHRONICITY, UNSPECIFIED LOCATION: ICD-10-CM

## 2019-09-20 PROCEDURE — 99999 PR PBB SHADOW E&M-EST. PATIENT-LVL III: ICD-10-PCS | Mod: PBBFAC,,, | Performed by: FAMILY MEDICINE

## 2019-09-20 PROCEDURE — 3080F DIAST BP >= 90 MM HG: CPT | Mod: CPTII,S$GLB,, | Performed by: FAMILY MEDICINE

## 2019-09-20 PROCEDURE — 99214 OFFICE O/P EST MOD 30 MIN: CPT | Mod: S$GLB,,, | Performed by: FAMILY MEDICINE

## 2019-09-20 PROCEDURE — 3080F PR MOST RECENT DIASTOLIC BLOOD PRESSURE >= 90 MM HG: ICD-10-PCS | Mod: CPTII,S$GLB,, | Performed by: FAMILY MEDICINE

## 2019-09-20 PROCEDURE — 3077F SYST BP >= 140 MM HG: CPT | Mod: CPTII,S$GLB,, | Performed by: FAMILY MEDICINE

## 2019-09-20 PROCEDURE — 3077F PR MOST RECENT SYSTOLIC BLOOD PRESSURE >= 140 MM HG: ICD-10-PCS | Mod: CPTII,S$GLB,, | Performed by: FAMILY MEDICINE

## 2019-09-20 PROCEDURE — 99214 PR OFFICE/OUTPT VISIT, EST, LEVL IV, 30-39 MIN: ICD-10-PCS | Mod: S$GLB,,, | Performed by: FAMILY MEDICINE

## 2019-09-20 PROCEDURE — 99999 PR PBB SHADOW E&M-EST. PATIENT-LVL III: CPT | Mod: PBBFAC,,, | Performed by: FAMILY MEDICINE

## 2019-09-20 RX ORDER — AMOXICILLIN AND CLAVULANATE POTASSIUM 875; 125 MG/1; MG/1
1 TABLET, FILM COATED ORAL EVERY 12 HOURS
Qty: 20 TABLET | Refills: 0 | Status: SHIPPED | OUTPATIENT
Start: 2019-09-20 | End: 2019-09-30

## 2019-09-20 RX ORDER — PROMETHAZINE HYDROCHLORIDE AND DEXTROMETHORPHAN HYDROBROMIDE 6.25; 15 MG/5ML; MG/5ML
5 SYRUP ORAL NIGHTLY PRN
Qty: 180 ML | Refills: 0 | Status: SHIPPED | OUTPATIENT
Start: 2019-09-20 | End: 2019-09-30

## 2019-09-20 RX ORDER — TRIAMCINOLONE ACETONIDE 1 MG/G
OINTMENT TOPICAL 2 TIMES DAILY
COMMUNITY

## 2019-09-20 RX ORDER — FLUCONAZOLE 150 MG/1
150 TABLET ORAL DAILY
Qty: 1 TABLET | Refills: 0 | Status: SHIPPED | OUTPATIENT
Start: 2019-09-20 | End: 2019-09-21

## 2019-09-20 RX ORDER — BENZONATATE 100 MG/1
100 CAPSULE ORAL 3 TIMES DAILY PRN
Qty: 45 CAPSULE | Refills: 0 | Status: SHIPPED | OUTPATIENT
Start: 2019-09-20 | End: 2019-09-30

## 2020-03-09 ENCOUNTER — PATIENT OUTREACH (OUTPATIENT)
Dept: ADMINISTRATIVE | Facility: HOSPITAL | Age: 40
End: 2020-03-09

## 2020-03-12 ENCOUNTER — PATIENT OUTREACH (OUTPATIENT)
Dept: ADMINISTRATIVE | Facility: HOSPITAL | Age: 40
End: 2020-03-12

## 2020-07-02 ENCOUNTER — PATIENT OUTREACH (OUTPATIENT)
Dept: ADMINISTRATIVE | Facility: HOSPITAL | Age: 40
End: 2020-07-02

## 2020-07-02 NOTE — PROGRESS NOTES
PreVisit Chart Audit Performed    updated with recent information     Last /100  Pt Not Eligible for Digital HTN       Shaye DINERO LPN Care Coordinator  Care Coordination Department  [facility]  [phone]

## 2021-02-26 DIAGNOSIS — Z00.00 ROUTINE GENERAL MEDICAL EXAMINATION AT A HEALTH CARE FACILITY: Primary | ICD-10-CM

## 2021-03-16 ENCOUNTER — HOSPITAL ENCOUNTER (OUTPATIENT)
Dept: CARDIOLOGY | Facility: HOSPITAL | Age: 41
Discharge: HOME | End: 2021-03-16

## 2021-03-16 ENCOUNTER — OFFICE VISIT (OUTPATIENT)
Dept: INTERNAL MEDICINE | Facility: CLINIC | Age: 41
End: 2021-03-16

## 2021-03-16 ENCOUNTER — CLINICAL SUPPORT (OUTPATIENT)
Dept: AUDIOLOGY | Facility: CLINIC | Age: 41
End: 2021-03-16

## 2021-03-16 ENCOUNTER — CLINICAL SUPPORT (OUTPATIENT)
Dept: INTERNAL MEDICINE | Facility: CLINIC | Age: 41
End: 2021-03-16

## 2021-03-16 ENCOUNTER — PATIENT OUTREACH (OUTPATIENT)
Dept: ADMINISTRATIVE | Facility: HOSPITAL | Age: 41
End: 2021-03-16

## 2021-03-16 VITALS
WEIGHT: 293 LBS | HEIGHT: 66 IN | DIASTOLIC BLOOD PRESSURE: 90 MMHG | RESPIRATION RATE: 16 BRPM | SYSTOLIC BLOOD PRESSURE: 173 MMHG | HEART RATE: 91 BPM | BODY MASS INDEX: 47.09 KG/M2

## 2021-03-16 VITALS
SYSTOLIC BLOOD PRESSURE: 177 MMHG | TEMPERATURE: 98 F | BODY MASS INDEX: 47.09 KG/M2 | HEART RATE: 98 BPM | WEIGHT: 293 LBS | DIASTOLIC BLOOD PRESSURE: 103 MMHG | HEIGHT: 66 IN | RESPIRATION RATE: 18 BRPM

## 2021-03-16 DIAGNOSIS — Z00.00 ROUTINE GENERAL MEDICAL EXAMINATION AT A HEALTH CARE FACILITY: Primary | ICD-10-CM

## 2021-03-16 DIAGNOSIS — I10 ESSENTIAL HYPERTENSION: ICD-10-CM

## 2021-03-16 DIAGNOSIS — G47.33 OSA ON CPAP: ICD-10-CM

## 2021-03-16 DIAGNOSIS — G43.009 MIGRAINE WITHOUT AURA AND RESPONSIVE TO TREATMENT: ICD-10-CM

## 2021-03-16 DIAGNOSIS — E66.01 MORBID OBESITY WITH BMI OF 50.0-59.9, ADULT: ICD-10-CM

## 2021-03-16 DIAGNOSIS — E88.810 INSULIN RESISTANCE SYNDROME: ICD-10-CM

## 2021-03-16 DIAGNOSIS — D50.0 IRON DEFICIENCY ANEMIA DUE TO CHRONIC BLOOD LOSS: ICD-10-CM

## 2021-03-16 DIAGNOSIS — K21.9 GASTROESOPHAGEAL REFLUX DISEASE WITHOUT ESOPHAGITIS: ICD-10-CM

## 2021-03-16 DIAGNOSIS — Z01.12 HEARING CONSERVATION AND TREATMENT EXAM: Primary | ICD-10-CM

## 2021-03-16 DIAGNOSIS — Z00.00 ROUTINE GENERAL MEDICAL EXAMINATION AT A HEALTH CARE FACILITY: ICD-10-CM

## 2021-03-16 DIAGNOSIS — I16.0 HYPERTENSIVE URGENCY: ICD-10-CM

## 2021-03-16 PROBLEM — R73.03 PREDIABETES: Status: RESOLVED | Noted: 2019-07-18 | Resolved: 2021-03-16

## 2021-03-16 PROBLEM — L81.9 HYPERPIGMENTATION OF SKIN: Status: RESOLVED | Noted: 2019-01-28 | Resolved: 2021-03-16

## 2021-03-16 PROCEDURE — 99999 PR PBB SHADOW E&M-EST. PATIENT-LVL III: CPT | Mod: PBBFAC,,, | Performed by: FAMILY MEDICINE

## 2021-03-16 PROCEDURE — 93005 ELECTROCARDIOGRAM TRACING: CPT

## 2021-03-16 PROCEDURE — 93010 ELECTROCARDIOGRAM REPORT: CPT | Mod: ,,, | Performed by: INTERNAL MEDICINE

## 2021-03-16 PROCEDURE — 93010 EKG 12-LEAD: ICD-10-PCS | Mod: ,,, | Performed by: INTERNAL MEDICINE

## 2021-03-16 PROCEDURE — 99395 PREV VISIT EST AGE 18-39: CPT | Mod: S$GLB,,, | Performed by: FAMILY MEDICINE

## 2021-03-16 PROCEDURE — 92552 PR PURE TONE AUDIOMETRY, AIR: ICD-10-PCS | Mod: S$GLB,,, | Performed by: AUDIOLOGIST-HEARING AID FITTER

## 2021-03-16 PROCEDURE — 92552 PURE TONE AUDIOMETRY AIR: CPT | Mod: S$GLB,,, | Performed by: AUDIOLOGIST-HEARING AID FITTER

## 2021-03-16 PROCEDURE — 99395 PR PREVENTIVE VISIT,EST,18-39: ICD-10-PCS | Mod: S$GLB,,, | Performed by: FAMILY MEDICINE

## 2021-03-16 PROCEDURE — 99999 PR PBB SHADOW E&M-EST. PATIENT-LVL III: ICD-10-PCS | Mod: PBBFAC,,, | Performed by: FAMILY MEDICINE

## 2021-03-16 RX ORDER — ORAL SEMAGLUTIDE 7 MG/1
7 TABLET ORAL DAILY
COMMUNITY
End: 2022-06-27 | Stop reason: ALTCHOICE

## 2021-03-23 ENCOUNTER — OFFICE VISIT (OUTPATIENT)
Dept: FAMILY MEDICINE | Facility: CLINIC | Age: 41
End: 2021-03-23
Payer: COMMERCIAL

## 2021-03-23 VITALS
BODY MASS INDEX: 47.09 KG/M2 | WEIGHT: 293 LBS | SYSTOLIC BLOOD PRESSURE: 130 MMHG | DIASTOLIC BLOOD PRESSURE: 80 MMHG | TEMPERATURE: 98 F | HEIGHT: 66 IN | OXYGEN SATURATION: 98 % | HEART RATE: 110 BPM

## 2021-03-23 DIAGNOSIS — Z00.00 ANNUAL PHYSICAL EXAM: Primary | ICD-10-CM

## 2021-03-23 DIAGNOSIS — I10 ESSENTIAL HYPERTENSION: ICD-10-CM

## 2021-03-23 DIAGNOSIS — E88.810 INSULIN RESISTANCE SYNDROME: ICD-10-CM

## 2021-03-23 DIAGNOSIS — G47.33 OSA ON CPAP: ICD-10-CM

## 2021-03-23 DIAGNOSIS — E66.01 MORBID OBESITY WITH BMI OF 50.0-59.9, ADULT: ICD-10-CM

## 2021-03-23 PROCEDURE — 3079F DIAST BP 80-89 MM HG: CPT | Mod: CPTII,S$GLB,, | Performed by: FAMILY MEDICINE

## 2021-03-23 PROCEDURE — 99395 PREV VISIT EST AGE 18-39: CPT | Mod: S$GLB,,, | Performed by: FAMILY MEDICINE

## 2021-03-23 PROCEDURE — 1126F PR PAIN SEVERITY QUANTIFIED, NO PAIN PRESENT: ICD-10-PCS | Mod: S$GLB,,, | Performed by: FAMILY MEDICINE

## 2021-03-23 PROCEDURE — 3008F BODY MASS INDEX DOCD: CPT | Mod: CPTII,S$GLB,, | Performed by: FAMILY MEDICINE

## 2021-03-23 PROCEDURE — 99395 PR PREVENTIVE VISIT,EST,18-39: ICD-10-PCS | Mod: S$GLB,,, | Performed by: FAMILY MEDICINE

## 2021-03-23 PROCEDURE — 3008F PR BODY MASS INDEX (BMI) DOCUMENTED: ICD-10-PCS | Mod: CPTII,S$GLB,, | Performed by: FAMILY MEDICINE

## 2021-03-23 PROCEDURE — 99999 PR PBB SHADOW E&M-EST. PATIENT-LVL V: CPT | Mod: PBBFAC,,, | Performed by: FAMILY MEDICINE

## 2021-03-23 PROCEDURE — 3075F PR MOST RECENT SYSTOLIC BLOOD PRESS GE 130-139MM HG: ICD-10-PCS | Mod: CPTII,S$GLB,, | Performed by: FAMILY MEDICINE

## 2021-03-23 PROCEDURE — 3075F SYST BP GE 130 - 139MM HG: CPT | Mod: CPTII,S$GLB,, | Performed by: FAMILY MEDICINE

## 2021-03-23 PROCEDURE — 3079F PR MOST RECENT DIASTOLIC BLOOD PRESSURE 80-89 MM HG: ICD-10-PCS | Mod: CPTII,S$GLB,, | Performed by: FAMILY MEDICINE

## 2021-03-23 PROCEDURE — 99999 PR PBB SHADOW E&M-EST. PATIENT-LVL V: ICD-10-PCS | Mod: PBBFAC,,, | Performed by: FAMILY MEDICINE

## 2021-03-23 PROCEDURE — 1126F AMNT PAIN NOTED NONE PRSNT: CPT | Mod: S$GLB,,, | Performed by: FAMILY MEDICINE

## 2021-03-23 RX ORDER — HYDROCHLOROTHIAZIDE 12.5 MG/1
12.5 TABLET ORAL DAILY
Qty: 90 TABLET | Refills: 1 | Status: SHIPPED | OUTPATIENT
Start: 2021-03-23 | End: 2021-10-19

## 2021-03-23 RX ORDER — NIFEDIPINE 30 MG/1
30 TABLET, FILM COATED, EXTENDED RELEASE ORAL DAILY
Qty: 90 TABLET | Refills: 1 | Status: SHIPPED | OUTPATIENT
Start: 2021-03-23 | End: 2021-10-19

## 2021-03-26 ENCOUNTER — TELEPHONE (OUTPATIENT)
Dept: INTERNAL MEDICINE | Facility: CLINIC | Age: 41
End: 2021-03-26

## 2021-03-31 ENCOUNTER — NURSE TRIAGE (OUTPATIENT)
Dept: ADMINISTRATIVE | Facility: CLINIC | Age: 41
End: 2021-03-31

## 2021-03-31 ENCOUNTER — TELEPHONE (OUTPATIENT)
Dept: FAMILY MEDICINE | Facility: CLINIC | Age: 41
End: 2021-03-31

## 2021-04-26 ENCOUNTER — TELEPHONE (OUTPATIENT)
Dept: FAMILY MEDICINE | Facility: CLINIC | Age: 41
End: 2021-04-26

## 2021-04-29 ENCOUNTER — PATIENT MESSAGE (OUTPATIENT)
Dept: RESEARCH | Facility: HOSPITAL | Age: 41
End: 2021-04-29

## 2021-05-26 DIAGNOSIS — Z12.31 OTHER SCREENING MAMMOGRAM: ICD-10-CM

## 2021-06-29 ENCOUNTER — TELEPHONE (OUTPATIENT)
Dept: PULMONOLOGY | Facility: CLINIC | Age: 41
End: 2021-06-29

## 2021-06-30 ENCOUNTER — TELEPHONE (OUTPATIENT)
Dept: PULMONOLOGY | Facility: CLINIC | Age: 41
End: 2021-06-30

## 2021-12-02 ENCOUNTER — TELEPHONE (OUTPATIENT)
Dept: FAMILY MEDICINE | Facility: CLINIC | Age: 41
End: 2021-12-02

## 2021-12-08 ENCOUNTER — PATIENT MESSAGE (OUTPATIENT)
Dept: FAMILY MEDICINE | Facility: CLINIC | Age: 41
End: 2021-12-08

## 2021-12-08 ENCOUNTER — TELEPHONE (OUTPATIENT)
Dept: FAMILY MEDICINE | Facility: CLINIC | Age: 41
End: 2021-12-08

## 2021-12-08 DIAGNOSIS — M79.672 PAIN OF LEFT HEEL: Primary | ICD-10-CM

## 2021-12-29 ENCOUNTER — PATIENT OUTREACH (OUTPATIENT)
Dept: ADMINISTRATIVE | Facility: OTHER | Age: 41
End: 2021-12-29

## 2022-01-04 ENCOUNTER — OFFICE VISIT (OUTPATIENT)
Dept: PODIATRY | Facility: CLINIC | Age: 42
End: 2022-01-04
Payer: COMMERCIAL

## 2022-01-04 VITALS — BODY MASS INDEX: 47.09 KG/M2 | HEIGHT: 66 IN | WEIGHT: 293 LBS

## 2022-01-04 DIAGNOSIS — E66.01 MORBID OBESITY WITH BMI OF 50.0-59.9, ADULT: ICD-10-CM

## 2022-01-04 DIAGNOSIS — M24.572 CONTRACTURE, LEFT ANKLE: ICD-10-CM

## 2022-01-04 DIAGNOSIS — M79.672 PAIN OF LEFT HEEL: ICD-10-CM

## 2022-01-04 DIAGNOSIS — M72.2 PLANTAR FASCIITIS: Primary | ICD-10-CM

## 2022-01-04 PROCEDURE — 99203 PR OFFICE/OUTPT VISIT, NEW, LEVL III, 30-44 MIN: ICD-10-PCS | Mod: 25,S$GLB,, | Performed by: PODIATRIST

## 2022-01-04 PROCEDURE — 1159F PR MEDICATION LIST DOCUMENTED IN MEDICAL RECORD: ICD-10-PCS | Mod: CPTII,S$GLB,, | Performed by: PODIATRIST

## 2022-01-04 PROCEDURE — 99203 OFFICE O/P NEW LOW 30 MIN: CPT | Mod: 25,S$GLB,, | Performed by: PODIATRIST

## 2022-01-04 PROCEDURE — 20550 PR INJECT TENDON SHEATH/LIGAMENT: ICD-10-PCS | Mod: LT,S$GLB,, | Performed by: PODIATRIST

## 2022-01-04 PROCEDURE — 99999 PR PBB SHADOW E&M-EST. PATIENT-LVL III: ICD-10-PCS | Mod: PBBFAC,,, | Performed by: PODIATRIST

## 2022-01-04 PROCEDURE — 1160F PR REVIEW ALL MEDS BY PRESCRIBER/CLIN PHARMACIST DOCUMENTED: ICD-10-PCS | Mod: CPTII,S$GLB,, | Performed by: PODIATRIST

## 2022-01-04 PROCEDURE — 1160F RVW MEDS BY RX/DR IN RCRD: CPT | Mod: CPTII,S$GLB,, | Performed by: PODIATRIST

## 2022-01-04 PROCEDURE — 3008F BODY MASS INDEX DOCD: CPT | Mod: CPTII,S$GLB,, | Performed by: PODIATRIST

## 2022-01-04 PROCEDURE — 20550 NJX 1 TENDON SHEATH/LIGAMENT: CPT | Mod: LT,S$GLB,, | Performed by: PODIATRIST

## 2022-01-04 PROCEDURE — 1159F MED LIST DOCD IN RCRD: CPT | Mod: CPTII,S$GLB,, | Performed by: PODIATRIST

## 2022-01-04 PROCEDURE — 99999 PR PBB SHADOW E&M-EST. PATIENT-LVL III: CPT | Mod: PBBFAC,,, | Performed by: PODIATRIST

## 2022-01-04 PROCEDURE — 3008F PR BODY MASS INDEX (BMI) DOCUMENTED: ICD-10-PCS | Mod: CPTII,S$GLB,, | Performed by: PODIATRIST

## 2022-01-04 RX ORDER — KETOROLAC TROMETHAMINE 10 MG/1
10 TABLET, FILM COATED ORAL EVERY 6 HOURS
Qty: 20 TABLET | Refills: 0 | Status: SHIPPED | OUTPATIENT
Start: 2022-01-04 | End: 2022-01-09

## 2022-01-04 RX ORDER — DEXAMETHASONE SODIUM PHOSPHATE 4 MG/ML
4 INJECTION, SOLUTION INTRA-ARTICULAR; INTRALESIONAL; INTRAMUSCULAR; INTRAVENOUS; SOFT TISSUE ONCE
Status: COMPLETED | OUTPATIENT
Start: 2022-01-04 | End: 2022-01-04

## 2022-01-04 RX ORDER — IBUPROFEN 800 MG/1
TABLET ORAL
COMMUNITY
Start: 2021-12-06 | End: 2022-01-13

## 2022-01-04 RX ORDER — RIMEGEPANT SULFATE 75 MG/75MG
75 TABLET, ORALLY DISINTEGRATING ORAL DAILY PRN
COMMUNITY
Start: 2022-01-03

## 2022-01-04 RX ORDER — TOPIRAMATE 100 MG/1
100 TABLET, FILM COATED ORAL DAILY
COMMUNITY
Start: 2022-01-03 | End: 2022-02-22

## 2022-01-04 RX ORDER — ZOLMITRIPTAN 5 MG/1
1 SPRAY NASAL DAILY PRN
COMMUNITY
Start: 2022-01-03 | End: 2022-02-22

## 2022-01-04 RX ORDER — TRIAMCINOLONE ACETONIDE 40 MG/ML
40 INJECTION, SUSPENSION INTRA-ARTICULAR; INTRAMUSCULAR ONCE
Status: COMPLETED | OUTPATIENT
Start: 2022-01-04 | End: 2022-01-04

## 2022-01-04 RX ADMIN — TRIAMCINOLONE ACETONIDE 40 MG: 40 INJECTION, SUSPENSION INTRA-ARTICULAR; INTRAMUSCULAR at 12:01

## 2022-01-04 RX ADMIN — DEXAMETHASONE SODIUM PHOSPHATE 4 MG: 4 INJECTION, SOLUTION INTRA-ARTICULAR; INTRALESIONAL; INTRAMUSCULAR; INTRAVENOUS; SOFT TISSUE at 12:01

## 2022-01-04 NOTE — PROGRESS NOTES
Subjective:       Patient ID: Aggie Horton is a 41 y.o. female.    Chief Complaint: Heel Pain (Left heel pain with and without pressure applied to it, 8/10 pain at present, non-diabetic, currently wearing slip ons, pcp  )      HPI: Aggie Horton complains of severe pains to the left foot. States pains are sharp and stabbing-like in nature. Pains are to the plantar foot, mostly with walking and standing. Rates the pains at approx. 8/10. States post-static dyskinesia. Denies any recent identifiable trauma. Does limp with gait. No NSAID medications thus far. Pains have been present for the past several weeks to months and the pains have worsened over the past couple of weeks. She does not have a history of plantar fasciitis. States walking and standing causes and/or exacerbates the symptoms. Patient has had no corticosteroid injection(s) to the left foot, prior. Patient's Primary Care Provider is Leslie Umaña MD.     Review of patient's allergies indicates:   Allergen Reactions    Azithromycin        Past Medical History:   Diagnosis Date    Hypertension     Insulin resistance     KELYL on CPAP        Family History   Problem Relation Age of Onset    Hypertension Mother     Hypothyroidism Mother     Hypertension Father     Diabetes Father     No Known Problems Brother     No Known Problems Brother     Colon cancer Maternal Grandmother     Breast cancer Neg Hx     Heart disease Neg Hx     Stroke Neg Hx        Social History     Socioeconomic History    Marital status: Single    Number of children: 0   Occupational History    Occupation:      Comment: [employer]   Tobacco Use    Smoking status: Never Smoker    Smokeless tobacco: Never Used   Substance and Sexual Activity    Alcohol use: Yes     Comment: occasionally    Drug use: No    Sexual activity: Not Currently     Partners: Male     Birth control/protection: None     Comment: 1 partner   Social History  "Narrative    She wears seatbelt.     Social Determinants of Health     Financial Resource Strain: Low Risk     Difficulty of Paying Living Expenses: Not hard at all   Food Insecurity: No Food Insecurity    Worried About Running Out of Food in the Last Year: Never true    Ran Out of Food in the Last Year: Never true   Transportation Needs: No Transportation Needs    Lack of Transportation (Medical): No    Lack of Transportation (Non-Medical): No   Physical Activity: Inactive    Days of Exercise per Week: 0 days    Minutes of Exercise per Session: 0 min   Stress: Stress Concern Present    Feeling of Stress : To some extent   Social Connections: Unknown    Frequency of Communication with Friends and Family: More than three times a week    Frequency of Social Gatherings with Friends and Family: Twice a week    Active Member of Clubs or Organizations: Yes    Attends Club or Organization Meetings: More than 4 times per year    Marital Status: Never        Past Surgical History:   Procedure Laterality Date    CHOLECYSTECTOMY         Review of Systems   Constitutional: Negative for chills, fatigue and fever.   HENT: Negative for hearing loss.    Eyes: Negative for photophobia and visual disturbance.   Respiratory: Negative for cough, chest tightness, shortness of breath and wheezing.    Cardiovascular: Negative for chest pain and palpitations.   Gastrointestinal: Negative for constipation, diarrhea, nausea and vomiting.   Endocrine: Negative for cold intolerance and heat intolerance.   Genitourinary: Negative for flank pain.   Musculoskeletal: Positive for gait problem. Negative for arthralgias, neck pain and neck stiffness.   Skin: Negative for wound.   Neurological: Negative for light-headedness and headaches.   Psychiatric/Behavioral: Negative for sleep disturbance.         Objective:   Ht 5' 6" (1.676 m)   Wt (!) 157.7 kg (347 lb 10.7 oz)   BMI 56.11 kg/m²         Physical Exam   LOWER EXTREMITY " PHYSICAL EXAMINATION    NEUROLOGY: Proprioception is intact. Sensation to light touch is intact. Negative Tinel's Sign and negative Valleix Sign. No neurological sensations with compression of the area of Coelho's Nerve in the area of the Abductor Hallucis muscle belly.    ORTHOPEDIC: There is moderate to severe tenderness to palpation of the area around the plantar medial calcaneal tubercle on the left foot. Pains are characterized as sharp and stabbing-like with direct palpation of the area. There is also mild pain to palpation of the immediate plantar aspect of the heel, and mild pains to the lateral band of the fascia. No edema is noted. No fullness is noted. No pains or defects are noted within the plantar fascia at the arch. No plantar fibromas are noted. No defects are noted within the ligament. Dorsiflexion of the MTPJs with simultaneous palpation of the fascia at the arch, does worsen and exacerbate the pains. No pains with medial to lateral compression of the heel. Equinus contracture is noted. Antalgic gait pattern is noted.     DERMATOLOGY: Skin is supple, dry and intact.    Assessment:     1. Plantar fasciitis    2. Pain of left heel    3. Contracture, left ankle    4. Morbid obesity with BMI of 50.0-59.9, adult          Plan:     Plantar fasciitis  -     triamcinolone acetonide injection 40 mg  -     dexamethasone injection 4 mg  -     ketorolac (TORADOL) 10 mg tablet; Take 1 tablet (10 mg total) by mouth every 6 (six) hours. for 5 days  Dispense: 20 tablet; Refill: 0    Pain of left heel  -     Ambulatory referral/consult to Podiatry    Contracture, left ankle    Morbid obesity with BMI of 50.0-59.9, adult      Thorough discussion is had with the patient today, concerning the diagnosis, its etiology, and the treatment algorithm at present.     Patient is counseled and reminded concerning the importance of good nutrition and healthy eating habits, which may include blood sugar control, to prevent  and/or help podiatric foot and ankle complications.    Following sterile preparation with alcohol swab and/or betadine paint, injection was given into and around the area of the left plantar medial calcaneal tubercle, using 25-gauge needle. Injection consisted of approximately 0.5cc of Dexamethasone Sodium Phosphate, 0.5cc of Kenalog-40 and 0.5cc of 1% Lidocaine w/ or w/o Epinephrine or 0.25% or 0.50% Marcaine plain. Bandage application thereafter. Patient tolerated procedure well, and without complications or complaints. Patient educated that the area of pathology, might actually be slightly more painful, due to the injection, over the course of the next one to two days.     Did discuss proper and supportive shoe gear in detail and at length with the patient.  These are shoes with firm and robust arch support; medial counter.  Shoes which only bend at the metatarsophalangeal joint and which are rigid in the midfoot and hindfoot. Patient urged to purchase running type or cross training type shoes gear which are designed for pronation control.           Future Appointments   Date Time Provider Department Center   1/13/2022  3:00 PM [facility]  MAMMO1 SCREEN [facility] M[facility]MO [place] [facility]   1/13/2022  4:00 PM Cadence Hernandez MD [facility]BGYN [place] [facility]   2/22/2022  1:00 PM Leslie Umaña MD [facility]

## 2022-02-22 ENCOUNTER — OFFICE VISIT (OUTPATIENT)
Dept: FAMILY MEDICINE | Facility: CLINIC | Age: 42
End: 2022-02-22
Payer: COMMERCIAL

## 2022-02-22 VITALS
WEIGHT: 293 LBS | DIASTOLIC BLOOD PRESSURE: 78 MMHG | TEMPERATURE: 98 F | OXYGEN SATURATION: 100 % | BODY MASS INDEX: 47.09 KG/M2 | HEIGHT: 66 IN | HEART RATE: 108 BPM | SYSTOLIC BLOOD PRESSURE: 132 MMHG

## 2022-02-22 DIAGNOSIS — Z00.00 ANNUAL PHYSICAL EXAM: Primary | ICD-10-CM

## 2022-02-22 DIAGNOSIS — E66.01 MORBID OBESITY WITH BMI OF 50.0-59.9, ADULT: ICD-10-CM

## 2022-02-22 DIAGNOSIS — E88.810 INSULIN RESISTANCE SYNDROME: ICD-10-CM

## 2022-02-22 DIAGNOSIS — L81.9 HYPERPIGMENTATION OF SKIN: ICD-10-CM

## 2022-02-22 DIAGNOSIS — R73.03 PREDIABETES: ICD-10-CM

## 2022-02-22 DIAGNOSIS — I10 ESSENTIAL HYPERTENSION: ICD-10-CM

## 2022-02-22 DIAGNOSIS — G47.33 OSA ON CPAP: ICD-10-CM

## 2022-02-22 PROCEDURE — 3008F BODY MASS INDEX DOCD: CPT | Mod: CPTII,S$GLB,, | Performed by: FAMILY MEDICINE

## 2022-02-22 PROCEDURE — 3078F PR MOST RECENT DIASTOLIC BLOOD PRESSURE < 80 MM HG: ICD-10-PCS | Mod: CPTII,S$GLB,, | Performed by: FAMILY MEDICINE

## 2022-02-22 PROCEDURE — 99999 PR PBB SHADOW E&M-EST. PATIENT-LVL IV: CPT | Mod: PBBFAC,,, | Performed by: FAMILY MEDICINE

## 2022-02-22 PROCEDURE — 3075F PR MOST RECENT SYSTOLIC BLOOD PRESS GE 130-139MM HG: ICD-10-PCS | Mod: CPTII,S$GLB,, | Performed by: FAMILY MEDICINE

## 2022-02-22 PROCEDURE — 3075F SYST BP GE 130 - 139MM HG: CPT | Mod: CPTII,S$GLB,, | Performed by: FAMILY MEDICINE

## 2022-02-22 PROCEDURE — 99396 PR PREVENTIVE VISIT,EST,40-64: ICD-10-PCS | Mod: S$GLB,,, | Performed by: FAMILY MEDICINE

## 2022-02-22 PROCEDURE — 1160F RVW MEDS BY RX/DR IN RCRD: CPT | Mod: CPTII,S$GLB,, | Performed by: FAMILY MEDICINE

## 2022-02-22 PROCEDURE — 1159F PR MEDICATION LIST DOCUMENTED IN MEDICAL RECORD: ICD-10-PCS | Mod: CPTII,S$GLB,, | Performed by: FAMILY MEDICINE

## 2022-02-22 PROCEDURE — 99999 PR PBB SHADOW E&M-EST. PATIENT-LVL IV: ICD-10-PCS | Mod: PBBFAC,,, | Performed by: FAMILY MEDICINE

## 2022-02-22 PROCEDURE — 99396 PREV VISIT EST AGE 40-64: CPT | Mod: S$GLB,,, | Performed by: FAMILY MEDICINE

## 2022-02-22 PROCEDURE — 1159F MED LIST DOCD IN RCRD: CPT | Mod: CPTII,S$GLB,, | Performed by: FAMILY MEDICINE

## 2022-02-22 PROCEDURE — 3008F PR BODY MASS INDEX (BMI) DOCUMENTED: ICD-10-PCS | Mod: CPTII,S$GLB,, | Performed by: FAMILY MEDICINE

## 2022-02-22 PROCEDURE — 1160F PR REVIEW ALL MEDS BY PRESCRIBER/CLIN PHARMACIST DOCUMENTED: ICD-10-PCS | Mod: CPTII,S$GLB,, | Performed by: FAMILY MEDICINE

## 2022-02-22 PROCEDURE — 3078F DIAST BP <80 MM HG: CPT | Mod: CPTII,S$GLB,, | Performed by: FAMILY MEDICINE

## 2022-02-22 RX ORDER — TOPIRAMATE 100 MG/1
100 TABLET, FILM COATED ORAL
COMMUNITY
Start: 2022-02-14 | End: 2024-02-27

## 2022-02-22 RX ORDER — NIFEDIPINE 30 MG/1
30 TABLET, FILM COATED, EXTENDED RELEASE ORAL DAILY
Qty: 90 TABLET | Refills: 1 | Status: SHIPPED | OUTPATIENT
Start: 2022-02-22 | End: 2022-08-22 | Stop reason: SINTOL

## 2022-02-22 RX ORDER — NORTRIPTYLINE HYDROCHLORIDE 25 MG/1
25 CAPSULE ORAL NIGHTLY
COMMUNITY
Start: 2022-02-14 | End: 2024-02-27

## 2022-02-22 RX ORDER — HYDROCHLOROTHIAZIDE 12.5 MG/1
12.5 TABLET ORAL DAILY
Qty: 90 TABLET | Refills: 1 | Status: SHIPPED | OUTPATIENT
Start: 2022-02-22 | End: 2023-05-18 | Stop reason: SDUPTHER

## 2022-02-22 RX ORDER — AMMONIUM LACTATE 12 G/100G
CREAM TOPICAL
Qty: 385 G | Refills: 0 | Status: SHIPPED | OUTPATIENT
Start: 2022-02-22

## 2022-02-22 RX ORDER — SUMATRIPTAN 20 MG/1
SPRAY NASAL
COMMUNITY
Start: 2022-02-14 | End: 2022-08-22

## 2022-02-22 NOTE — PROGRESS NOTES
HISTORY OF PRESENT ILLNESS:  Ms. Horton comes in today nonfasting and without taken medication for annual wellness examination.     END OF LIFE DECISION: She does not have a living will. She does desire life support.    Current Outpatient Medications   Medication Sig    ammonium lactate 12 % Crea Apply topically twice daily    aspirin-acetaminophen-caffeine 250-250-65 mg (EXCEDRIN MIGRAINE) 250-250-65 mg per tablet Excedrin Migraine Take PRN No date recorded No form recorded No frequency recorded No route recorded No set duration recorded No set duration amount recorded active No dosage strength recorded No dosage strength units of measure recorded    ferrous sulfate (FEOSOL) 325 mg (65 mg iron) Tab tablet Take 325 mg by mouth daily with breakfast.    hydroCHLOROthiazide (HYDRODIURIL) 12.5 MG Tab TAKE 1 TABLET BY MOUTH EVERY DAY    NIFEdipine (ADALAT CC) 30 MG TbSR TAKE 1 TABLET BY MOUTH EVERY DAY    nortriptyline (PAMELOR) 10 MG capsule Take by mouth.    rimegepant (NURTEC) 75 mg odt Take 75 mg by mouth daily as needed.    semaglutide (RYBELSUS) 7 mg tablet Take 7 mg by mouth once daily.    SUMAtriptan (IMITREX) 20 mg/actuation nasal spray PLEASE SEE ATTACHED FOR DETAILED DIRECTIONS    topiramate (TOPAMAX) 100 MG tablet Take 100 mg by mouth.    triamcinolone acetonide 0.1% (KENALOG) 0.1 % ointment Apply topically 2 (two) times daily.    vitamin D 1000 units Tab Take 185 mg by mouth.      SCREENINGS:    Cholesterol: March 16, 2021 with [facility] at [facility].  Colonoscopy: June 2, 2015 per patient.  GYN Exam: January 13, 2022 pap smear with GYN Dr. Cadence shelley.  Mammogram: January 13, 2022 with GYN Dr. Cadence Hernandez with follow up with ultrasound and breast specialist advised.   Eye Exam: March 16, 2021 at [facility].  HIVAb: In the past per patient. She desires.  HCVAb: Never. She desires.  PPD: Negative in the past.      Immunizations:    Tdap: > 10 years ago per patient. She declines.  Flu shot: In the  past.  She declines.  Pneumovax: Never. She declines.         Covid-19 (Pfizer) vaccine: July 7, 2021, July 28, 2021.                            ROS:  GENERAL: Denies fever, chills, fatigue or unusual weight change. Appetite normal. Exercises does not. Monitors diet does (yoselin). Weight 157.7 kg (347 lb 12.4 oz) at March 23, 2021 visit.  SKIN: Denies rashes, itching, changes in mole, color or texture of skin or easy bruising  HEAD:  Denies recent head trauma except reports slight headache today (manageable). Reports follows with neurologist Dr. Linda Mai for migraine treatment and surveillance with last visit of February 14, 2022 with 6-week follow up advised.  EYES: Denies change in vision, pain, diplopia, redness or discharge.  EARS: Denies ear pain, discharge, vertigo or decreased hearing.  NOSE: Denies loss of smell, epistaxis or rhinitis.  MOUTH & THROAT: Denies hoarseness or change in voice. Denies excessive gum bleeding or mouth sores.  Denies sore throat.  NODES: Denies swollen glands.  CHEST: Denies GALLAGHER, wheezing, cough, or sputum production. Saw pulmonologist Dr. Umang Hernandez for KELLY on CPAP with last visit on July 23, 2021.   CARDIOVASCULAR: Denies chest pain, PND, orthopnea or reduced exercise tolerance.  Denies palpitations. Reports rarely performs home blood pressure checks with levels ranging 180-190/100 when she has migraine headaches. Reports takes HCTZ 12.5 mg + Nifedipine CC 30 mg daily for blood pressure control.   ABDOMEN: Denies diarrhea, constipation, nausea, vomiting, abdominal pain, or blood in stool.  URINARY: Denies flank pain, dysuria or hematuria.  GENITOURINARY: Denies flank pain, dysuria, frequency or hematuria. No change in menses. Performs monthly breast exams does.  ENDOCRINE: Denies diabetes, thyroid, cholesterol problems but reports history of insulin resistance. Performs home glucose checks does not. [place] [facility] [place] with  "last visit in  April 2021 and states she continues to take Rybelsus for IRS, weight issue.  HEME/LYMPH: Denies bleeding problems.  PERIPHERAL VASCULAR:Denies claudication or cyanosis.  MUSCULOSKELETAL: Denies joint stiffness, pain or swelling. Denies edema.   NEUROLOGIC: Denies history of seizures, tremors, paralysis, alteration of gait or coordination.  PSYCHIATRIC: Denies mood swings, depression, anxiety, homicidal or suicidal thoughts. Denies sleep problems.     PE:   /78   Pulse 108   Temp 98.3 °F (36.8 °C)   Ht 5' 6" (1.676 m)   Wt (!) 166.2 kg (366 lb 6.5 oz)   LMP 01/28/2022 Comment: Monthly  SpO2 100%   BMI 59.14 kg/m²   APPEARANCE:  Well nourished, well developed female, pleasant and obese, alert and oriented in no acute distress.    HEAD: Nontender. Full range of motion.  EYES: PERRL, conjunctiva pink, lids no edema.   EARS: External canal patent with wax noted. No swelling or redness. TM's shiny and clear.  NOSE: Mucosa and turbinates pink, not swollen. No discharge. Nontender sinuses.  THROAT: No pharyngeal erythema or exudate. No stridor.   NECK: Supple, no mass, thyroid not enlarged.  NODES: No cervical lymph node enlargement.  CHEST: Normal respiratory effort. Lungs clear to auscultation.  CARDIOVASCULAR: Normal S1, S2. No rubs, murmurs or gallops. PMI not displaced. No carotid bruit. Pedal pulses palpable bilaterally. No edema.  ABDOMEN: Bowel sounds present. Not distended. Soft. No tenderness, masses or organomegaly.  BREAST EXAM: Not examined.  PELVIC EXAM: Not examined.  RECTAL EXAM: Not examined.  MUSCULOSKELETAL: No joint deformities or stiffness. Non tender right knee today.  She is ambulatory without problems.  SKIN: No rashes or suspicious lesions, normal color and turgor.  NEUROLOGIC:   Cranial Nerves: II-XII grossly intact.   DTR's: Knees, Ankles 2+ and equal bilaterally. Gait & Posture: Normal gait and fine motion.  PSYCHIATRIC:Patient alert, oriented x 3. Mood/Affect normal " without acute anxiety or depression noted. Judgment/insight good as she makes appropriate decisions during today's exam.    ASSESSMENT:    ICD-10-CM ICD-9-CM    1. Annual physical exam  Z00.00 V70.0 Hepatitis C Antibody      HIV 1/2 Ag/Ab (4th Gen)      Hemoglobin A1C      Insulin, Random      Comprehensive Metabolic Panel      Urinalysis      TSH      CBC Auto Differential      Hepatitis C Antibody      HIV 1/2 Ag/Ab (4th Gen)      Hemoglobin A1C      Insulin, Random      Comprehensive Metabolic Panel      Urinalysis      TSH      CBC Auto Differential      CANCELED: CBC Auto Differential      CANCELED: TSH      CANCELED: Urinalysis      CANCELED: Comprehensive Metabolic Panel      CANCELED: Insulin, Random      CANCELED: Hemoglobin A1C      CANCELED: HIV 1/2 Ag/Ab (4th Gen)      CANCELED: Hepatitis C Antibody   2. Essential hypertension  I10 401.9 NIFEdipine (ADALAT CC) 30 MG TbSR      hydroCHLOROthiazide (HYDRODIURIL) 12.5 MG Tab   3. Insulin resistance syndrome  E88.81 277.7    4. Prediabetes  R73.03 790.29    5. KELLY on CPAP  G47.33 327.23     Z99.89 V46.8    6. Hyperpigmentation of skin  L81.9 709.00 ammonium lactate 12 % Crea   7. Morbid obesity with BMI of 50.0-59.9, adult  E66.01 278.01     Z68.43 V85.43        PLAN:  1. Age-appropriate counseling-appropriate low-sodium, low-cholesterol, low carbohydrate diet and exercise daily, monthly breast self exam, annual wellness examination.   2. Patient advised to call for results.  3. Continue current medications.  4. Keep follow up with specialists.  5. Follow up in about 6 months (around 8/22/2022) for hypertension follow up.

## 2022-02-27 LAB
ALBUMIN SERPL-MCNC: 4.2 G/DL (ref 3.8–4.8)
ALBUMIN/GLOB SERPL: 1.5 {RATIO} (ref 1.2–2.2)
ALP SERPL-CCNC: 82 IU/L (ref 44–121)
ALT SERPL-CCNC: 16 IU/L (ref 0–32)
APPEARANCE UR: CLEAR
AST SERPL-CCNC: 16 IU/L (ref 0–40)
BASOPHILS # BLD AUTO: 0.1 X10E3/UL (ref 0–0.2)
BASOPHILS NFR BLD AUTO: 1 %
BILIRUB SERPL-MCNC: 0.5 MG/DL (ref 0–1.2)
BILIRUB UR QL STRIP: NEGATIVE
BUN SERPL-MCNC: 7 MG/DL (ref 6–24)
BUN/CREAT SERPL: 7 (ref 9–23)
CALCIUM SERPL-MCNC: 9.6 MG/DL (ref 8.7–10.2)
CHLORIDE SERPL-SCNC: 102 MMOL/L (ref 96–106)
CO2 SERPL-SCNC: 19 MMOL/L (ref 20–29)
COLOR UR: YELLOW
CREAT SERPL-MCNC: 1.04 MG/DL (ref 0.57–1)
EOSINOPHIL # BLD AUTO: 0.2 X10E3/UL (ref 0–0.4)
EOSINOPHIL NFR BLD AUTO: 2 %
ERYTHROCYTE [DISTWIDTH] IN BLOOD BY AUTOMATED COUNT: 13.9 % (ref 11.7–15.4)
GLOBULIN SER CALC-MCNC: 2.8 G/DL (ref 1.5–4.5)
GLUCOSE SERPL-MCNC: 96 MG/DL (ref 65–99)
GLUCOSE UR QL STRIP: NEGATIVE
HBA1C MFR BLD: 5.9 % (ref 4.8–5.6)
HCT VFR BLD AUTO: 39.4 % (ref 34–46.6)
HCV AB S/CO SERPL IA: <0.1 S/CO RATIO (ref 0–0.9)
HGB BLD-MCNC: 11.8 G/DL (ref 11.1–15.9)
HGB UR QL STRIP: NEGATIVE
HIV 1+2 AB+HIV1 P24 AG SERPL QL IA: NON REACTIVE
IMM GRANULOCYTES # BLD AUTO: 0 X10E3/UL (ref 0–0.1)
IMM GRANULOCYTES NFR BLD AUTO: 0 %
INSULIN SERPL-ACNC: 39.1 UIU/ML (ref 2.6–24.9)
KETONES UR QL STRIP: NEGATIVE
LEUKOCYTE ESTERASE UR QL STRIP: NEGATIVE
LYMPHOCYTES # BLD AUTO: 2.9 X10E3/UL (ref 0.7–3.1)
LYMPHOCYTES NFR BLD AUTO: 28 %
MCH RBC QN AUTO: 24 PG (ref 26.6–33)
MCHC RBC AUTO-ENTMCNC: 29.9 G/DL (ref 31.5–35.7)
MCV RBC AUTO: 80 FL (ref 79–97)
MICRO URNS: NORMAL
MONOCYTES # BLD AUTO: 0.6 X10E3/UL (ref 0.1–0.9)
MONOCYTES NFR BLD AUTO: 6 %
NEUTROPHILS # BLD AUTO: 6.4 X10E3/UL (ref 1.4–7)
NEUTROPHILS NFR BLD AUTO: 63 %
NITRITE UR QL STRIP: NEGATIVE
PH UR STRIP: 7 [PH] (ref 5–7.5)
PLATELET # BLD AUTO: 423 X10E3/UL (ref 150–450)
POTASSIUM SERPL-SCNC: 4.2 MMOL/L (ref 3.5–5.2)
PROT SERPL-MCNC: 7 G/DL (ref 6–8.5)
PROT UR QL STRIP: NEGATIVE
RBC # BLD AUTO: 4.92 X10E6/UL (ref 3.77–5.28)
SODIUM SERPL-SCNC: 139 MMOL/L (ref 134–144)
SP GR UR STRIP: 1 (ref 1–1.03)
TSH SERPL DL<=0.005 MIU/L-ACNC: 1.21 UIU/ML (ref 0.45–4.5)
UROBILINOGEN UR STRIP-MCNC: 0.2 MG/DL (ref 0.2–1)
WBC # BLD AUTO: 10.2 X10E3/UL (ref 3.4–10.8)

## 2022-04-01 ENCOUNTER — TELEPHONE (OUTPATIENT)
Dept: FAMILY MEDICINE | Facility: CLINIC | Age: 42
End: 2022-04-01
Payer: COMMERCIAL

## 2022-04-01 ENCOUNTER — PATIENT MESSAGE (OUTPATIENT)
Dept: FAMILY MEDICINE | Facility: CLINIC | Age: 42
End: 2022-04-01
Payer: COMMERCIAL

## 2022-04-01 NOTE — TELEPHONE ENCOUNTER
----- Message from Soumya Rivas sent at 4/1/2022 10:13 AM CDT -----  Contact: Patient [phone]  Good Morning  Patient is calling to discuss labs she had done at [facility] on 02/23/2022    Please call and advise

## 2022-04-01 NOTE — TELEPHONE ENCOUNTER
Advise pt lab results are within acceptable range except show she continues to have prediabetes with insulin resistance. Please continue current medications and follow stricter low carbohydrate diet and exercise daily to help lower these levels.  Also, if she is continuing to follow with weight management at [facility], she may desire to increase Rebylsus to help.    Also, one of her kidney function test is borderline abnormal.  Please avoid taking NSAID's (Advil, Ibuprofen, Motrin, Aleve) as these may cause her to have abnormal kidney function. Let's plan to monitor and recheck at her follow up appointment with me.    Please keep  8/22/2022 appointment with me.     Thanks for her patience.

## 2022-06-22 ENCOUNTER — PATIENT MESSAGE (OUTPATIENT)
Dept: FAMILY MEDICINE | Facility: CLINIC | Age: 42
End: 2022-06-22
Payer: COMMERCIAL

## 2022-06-23 ENCOUNTER — TELEPHONE (OUTPATIENT)
Dept: FAMILY MEDICINE | Facility: CLINIC | Age: 42
End: 2022-06-23
Payer: COMMERCIAL

## 2022-06-23 DIAGNOSIS — E88.810 INSULIN RESISTANCE SYNDROME: ICD-10-CM

## 2022-06-23 DIAGNOSIS — R73.03 PREDIABETES: Primary | ICD-10-CM

## 2022-06-23 DIAGNOSIS — E66.01 MORBID OBESITY WITH BMI OF 50.0-59.9, ADULT: ICD-10-CM

## 2022-06-23 NOTE — TELEPHONE ENCOUNTER
"Pt portal message    "Dr. Umaña, On yesterday, I had a f/u appt with Dr. Herring at [facility]  (Weight Mgmt) and he suggested that I contact my PCP to request a continuation of the Rybelsus prescription to ensure approval by my insurance.  Dr. Herring mentioned that from his experience, insurance companies rarely continue the long-term prescription at the Weight [facility]'s office request. Dr. Herring stated that the outcome may be different when submitted by my PCP. I know that I have tried Metformin in the past and it caused stomach cramps, which I do not experience with Rybelsus. Currently, I'm prescribed Rybelsus 3mg/daily. Please advise"    Please advise  "

## 2022-06-27 RX ORDER — ORAL SEMAGLUTIDE 3 MG/1
3 TABLET ORAL DAILY
Qty: 90 TABLET | Refills: 0 | Status: SHIPPED | OUTPATIENT
Start: 2022-06-27 | End: 2022-12-19 | Stop reason: SDUPTHER

## 2022-06-27 NOTE — TELEPHONE ENCOUNTER
Advise pt I will submit Rx as requested; however, I have not had good response from insurances either. Keep 8/22/2022 scheduled appt w/me.    I have put the following orders and/or medications to this note.  Please advise pt and assist.    No orders of the defined types were placed in this encounter.      Medications Ordered This Encounter   Medications    semaglutide (RYBELSUS) 3 mg tablet     Sig: Take 1 tablet (3 mg total) by mouth once daily.     Dispense:  90 tablet     Refill:  0

## 2022-08-22 ENCOUNTER — OFFICE VISIT (OUTPATIENT)
Dept: FAMILY MEDICINE | Facility: CLINIC | Age: 42
End: 2022-08-22
Payer: COMMERCIAL

## 2022-08-22 VITALS
TEMPERATURE: 99 F | BODY MASS INDEX: 47.09 KG/M2 | WEIGHT: 293 LBS | HEART RATE: 108 BPM | SYSTOLIC BLOOD PRESSURE: 140 MMHG | DIASTOLIC BLOOD PRESSURE: 68 MMHG | HEIGHT: 66 IN | OXYGEN SATURATION: 98 %

## 2022-08-22 DIAGNOSIS — R22.0 SWELLING OF UPPER LIP: ICD-10-CM

## 2022-08-22 DIAGNOSIS — E66.01 MORBID OBESITY WITH BMI OF 60.0-69.9, ADULT: ICD-10-CM

## 2022-08-22 DIAGNOSIS — G43.009 MIGRAINE WITHOUT AURA AND RESPONSIVE TO TREATMENT: ICD-10-CM

## 2022-08-22 DIAGNOSIS — I10 ESSENTIAL HYPERTENSION: Primary | ICD-10-CM

## 2022-08-22 DIAGNOSIS — E88.810 INSULIN RESISTANCE SYNDROME: ICD-10-CM

## 2022-08-22 PROCEDURE — 3008F BODY MASS INDEX DOCD: CPT | Mod: CPTII,S$GLB,, | Performed by: FAMILY MEDICINE

## 2022-08-22 PROCEDURE — 3078F DIAST BP <80 MM HG: CPT | Mod: CPTII,S$GLB,, | Performed by: FAMILY MEDICINE

## 2022-08-22 PROCEDURE — 3077F PR MOST RECENT SYSTOLIC BLOOD PRESSURE >= 140 MM HG: ICD-10-PCS | Mod: CPTII,S$GLB,, | Performed by: FAMILY MEDICINE

## 2022-08-22 PROCEDURE — 3078F PR MOST RECENT DIASTOLIC BLOOD PRESSURE < 80 MM HG: ICD-10-PCS | Mod: CPTII,S$GLB,, | Performed by: FAMILY MEDICINE

## 2022-08-22 PROCEDURE — 99214 OFFICE O/P EST MOD 30 MIN: CPT | Mod: S$GLB,,, | Performed by: FAMILY MEDICINE

## 2022-08-22 PROCEDURE — 99214 PR OFFICE/OUTPT VISIT, EST, LEVL IV, 30-39 MIN: ICD-10-PCS | Mod: S$GLB,,, | Performed by: FAMILY MEDICINE

## 2022-08-22 PROCEDURE — 99999 PR PBB SHADOW E&M-EST. PATIENT-LVL IV: CPT | Mod: PBBFAC,,, | Performed by: FAMILY MEDICINE

## 2022-08-22 PROCEDURE — 99999 PR PBB SHADOW E&M-EST. PATIENT-LVL IV: ICD-10-PCS | Mod: PBBFAC,,, | Performed by: FAMILY MEDICINE

## 2022-08-22 PROCEDURE — 3008F PR BODY MASS INDEX (BMI) DOCUMENTED: ICD-10-PCS | Mod: CPTII,S$GLB,, | Performed by: FAMILY MEDICINE

## 2022-08-22 PROCEDURE — 3077F SYST BP >= 140 MM HG: CPT | Mod: CPTII,S$GLB,, | Performed by: FAMILY MEDICINE

## 2022-08-22 PROCEDURE — 3044F PR MOST RECENT HEMOGLOBIN A1C LEVEL <7.0%: ICD-10-PCS | Mod: CPTII,S$GLB,, | Performed by: FAMILY MEDICINE

## 2022-08-22 PROCEDURE — 3044F HG A1C LEVEL LT 7.0%: CPT | Mod: CPTII,S$GLB,, | Performed by: FAMILY MEDICINE

## 2022-08-22 RX ORDER — LABETALOL 100 MG/1
100 TABLET, FILM COATED ORAL 2 TIMES DAILY
Qty: 180 TABLET | Refills: 1 | Status: SHIPPED | OUTPATIENT
Start: 2022-08-22 | End: 2023-08-22 | Stop reason: SDUPTHER

## 2022-08-22 NOTE — PROGRESS NOTES
Aggie Horton    Chief Complaint   Patient presents with    Follow-up    Hypertension       History of Present Illness:   Ms. Horton comes in today for hypertension follow up. She is not fasting and has not taken medications today. She states she walks two times a week, 30 minutes each time.  She states she does not monitor her diet. She states she performs home blood pressure checks with good levels noted; however, she states she experiences headaches with taking Nifedipine and desires different medication for blood pressure control which does not cause headaches. She states she continues to take hydrochlorothiazide 25 mg daily as well for blood pressure control.     She admits she has been having less headaches with taking preventive headache medication at night along with PRN medication as prescribed by neurologist Dr. Linda Mai, whom she follows with for surveillance and treatment of migraine headache with next visit scheduled for September 29, 2022. She states headaches is 9/10 on pain scale at worst.    She states yesterday she experienced pain at both her knees without associated activity and states pain has been constant. She reports having occasional pain in her left knee with exercising in the past. She denies associated trauma or swelling. She states she has been taking or took Tylenol Arthritis last night with little help then took Ibuprofen 200 mg x 4 early this morning with help as she states pain is not as bad at this time.     She states she ate seafood platter on yesterday and subsequently notice swelling of her top lip last night. She states swelling remains without change but without pain. She states she's had similar symptoms in the past and was given an ointment by dermatology to use with help for lip swelling.    She states she saw Dr. Herring with [facility] for weight management on June 21, 2022  and states he requested PCP continue prescribing Rybelsus as insurance  may not provide coverage if written by weight management provider. She states she continues to take Rybelsus without problems. She states she has follow up with Dr. Herring in September 2022.    Otherwise, she denies having fever, chills, fatigue, appetite changes; shortness of breath, cough, wheezing; chest pain, palpitations, leg swelling; abdominal pain, nausea, vomiting, diarrhea, constipation; unusual urinary symptoms; polydipsia, polyphagia, polyuria, hot or cold intolerance; back pain; acute visual changes, numbness; anxiety, depression, homicidal or suicidal thoughts.      Labs:                    WBC                      10.2                02/26/2022                 HGB                      11.8                02/26/2022                 HCT                      39.4                02/26/2022                 PLT                      423                 02/26/2022                 CHOL                     171                 01/28/2019                 TRIG                     143                 01/28/2019                 HDL                      52                  01/28/2019                 ALT                      16                  02/26/2022                 AST                      16                  02/26/2022                 NA                       139                 02/26/2022                 K                        4.2                 02/26/2022                 CL                       102                 02/26/2022                 CREATININE               1.04 (H)            02/26/2022                 BUN                      7                   02/26/2022                 CO2                      19 (L)              02/26/2022                 TSH                      1.210               02/26/2022                 HGBA1C                   5.9 (H)             02/26/2022             LDLCALC                  90                  01/28/2019                Current Outpatient Medications   Medication  Sig    ammonium lactate 12 % Crea Apply topically twice daily    ferrous sulfate (FEOSOL) 325 mg (65 mg iron) Tab tablet Take 325 mg by mouth daily with breakfast.    hydroCHLOROthiazide (HYDRODIURIL) 12.5 MG Tab Take 1 tablet (12.5 mg total) by mouth once daily.    NIFEdipine (ADALAT CC) 30 MG TbSR Take 1 tablet (30 mg total) by mouth once daily.    nortriptyline (PAMELOR) 25 MG capsule Take 25 mg by mouth every evening.    rimegepant (NURTEC) 75 mg odt Take 75 mg by mouth daily as needed.    semaglutide (RYBELSUS) 3 mg tablet Take 1 tablet (3 mg total) by mouth once daily.    topiramate (TOPAMAX) 100 MG tablet Take 100 mg by mouth.    triamcinolone acetonide 0.1% (KENALOG) 0.1 % ointment Apply topically 2 (two) times daily.    vitamin D 1000 units Tab Take 185 mg by mouth.       Review of Systems   Constitutional:  Positive for activity change. Negative for appetite change, chills, fatigue and fever.        Weight 166.2 kg (366 lb 6.5 oz) at February 22, 2022 visit. See history of present illness.   HENT:          See history of present illness.   Eyes:  Negative for visual disturbance.   Respiratory:  Negative for cough, shortness of breath and wheezing.    Cardiovascular:  Negative for chest pain, palpitations and leg swelling.   Gastrointestinal:  Negative for abdominal pain, constipation, diarrhea, nausea and vomiting.   Endocrine: Negative for cold intolerance, heat intolerance, polydipsia, polyphagia and polyuria.        See history of present illness.   Genitourinary:  Negative for difficulty urinating and menstrual problem.   Musculoskeletal:  Positive for myalgias. Negative for arthralgias, back pain, joint swelling and neck pain.        See history of present illness.   Neurological:  Positive for headaches. Negative for numbness.        See history of present illness.   Psychiatric/Behavioral:  Negative for dysphoric mood and suicidal ideas. The patient is not nervous/anxious.         Negative for  homicidal ideas.     Objective:  Physical Exam  Vitals reviewed.   Constitutional:       General: She is not in acute distress.     Appearance: Normal appearance. She is well-developed. She is obese. She is not ill-appearing, toxic-appearing or diaphoretic.      Comments: Pleasant.   HENT:      Mouth/Throat:      Lips: No lesions.      Mouth: Mucous membranes are moist.      Pharynx: Oropharynx is clear. No oropharyngeal exudate or posterior oropharyngeal erythema.      Comments: Subtle swelling of top lip without drainage noted.  Neck:      Thyroid: No thyromegaly.   Cardiovascular:      Rate and Rhythm: Normal rate and regular rhythm.      Pulses:           Dorsalis pedis pulses are 3+ on the right side and 3+ on the left side.      Heart sounds: Normal heart sounds. No murmur heard.  Pulmonary:      Effort: Pulmonary effort is normal. No respiratory distress.      Breath sounds: Normal breath sounds. No wheezing or rales.   Abdominal:      General: Bowel sounds are normal. There is no distension.      Palpations: Abdomen is soft. There is no mass.      Tenderness: There is no abdominal tenderness. There is no guarding or rebound.   Musculoskeletal:         General: No swelling or tenderness. Normal range of motion.      Cervical back: Normal range of motion and neck supple. No tenderness.      Comments: She is ambulatory without problems. Non tender knees with full range of motion noted.   Feet:      Right foot:      Protective Sensation: 5 sites tested.  5 sites sensed.      Skin integrity: No ulcer or skin breakdown.      Left foot:      Protective Sensation: 5 sites tested.  5 sites sensed.      Skin integrity: No ulcer or skin breakdown.   Lymphadenopathy:      Cervical: No cervical adenopathy.   Neurological:      General: No focal deficit present.      Mental Status: She is alert and oriented to person, place, and time.   Psychiatric:         Mood and Affect: Mood normal.         Behavior: Behavior normal.          Thought Content: Thought content normal.         Judgment: Judgment normal.       ASSESSMENT:  1. Essential hypertension    2. Swelling of upper lip    3. Insulin resistance syndrome    4. Migraine without aura and responsive to treatment    5. Morbid obesity with BMI of 60.0-69.9, adult        PLAN:  Aggie was seen today for follow-up and hypertension.    Diagnoses and all orders for this visit:    Essential hypertension  -     labetaloL (NORMODYNE) 100 MG tablet; Take 1 tablet (100 mg total) by mouth 2 (two) times daily.  -     Comprehensive Metabolic Panel; Future    Swelling of upper lip    Insulin resistance syndrome  -     Insulin, Random; Future  -     Hemoglobin A1C; Future  -     Comprehensive Metabolic Panel; Future    Migraine without aura and responsive to treatment    Morbid obesity with BMI of 60.0-69.9, adult     Patient advised to call for results.  Continue current medications, follow low sodium, low cholesterol, low carb diet, daily walks.  Stop Nifedipine. Add Labetalol 100 mg twice daily for blood pressure control; medication precautions discussed with patient.  Home blood pressure monitoring 2 to 3 times per week advised with BP goal < 140/90.  Will continue to consider weight loss surgery.  Reassurance regarding lip swelling, knees.  Okay to take OTC antihistamine for swelling if needed.   Keep follow up with specialists.  Flu shot this fall.  Work excuse for today with return today.  Follow up in about 6 months (around 2/23/2023) for physical.

## 2022-08-22 NOTE — LETTER
August 22, 2022      [facility]  [address] [place] [address]  Phone: [phone]  Fax: [phone]       Patient: Aggie Horton   YOB: 1980  Date of Visit: 08/22/2022    To Whom It May Concern:    Danielle Horton  was at [facility] on 08/22/2022. The patient may return to work/school on 8/22/2022 with no restrictions. If you have any questions or concerns, or if I can be of further assistance, please do not hesitate to contact me.    Sincerely,    Tiana Aleman MA

## 2022-09-02 ENCOUNTER — PATIENT MESSAGE (OUTPATIENT)
Dept: FAMILY MEDICINE | Facility: CLINIC | Age: 42
End: 2022-09-02
Payer: COMMERCIAL

## 2022-09-16 ENCOUNTER — OFFICE VISIT (OUTPATIENT)
Dept: PODIATRY | Facility: CLINIC | Age: 42
End: 2022-09-16
Payer: COMMERCIAL

## 2022-09-16 VITALS — BODY MASS INDEX: 47.09 KG/M2 | WEIGHT: 293 LBS | HEIGHT: 66 IN

## 2022-09-16 DIAGNOSIS — E66.01 MORBID OBESITY: ICD-10-CM

## 2022-09-16 DIAGNOSIS — M72.2 PLANTAR FASCIITIS: ICD-10-CM

## 2022-09-16 DIAGNOSIS — M24.572 CONTRACTURE, LEFT ANKLE: ICD-10-CM

## 2022-09-16 DIAGNOSIS — M79.672 PAIN OF LEFT HEEL: Primary | ICD-10-CM

## 2022-09-16 PROCEDURE — 1160F PR REVIEW ALL MEDS BY PRESCRIBER/CLIN PHARMACIST DOCUMENTED: ICD-10-PCS | Mod: CPTII,S$GLB,, | Performed by: PODIATRIST

## 2022-09-16 PROCEDURE — 3044F HG A1C LEVEL LT 7.0%: CPT | Mod: CPTII,S$GLB,, | Performed by: PODIATRIST

## 2022-09-16 PROCEDURE — 1159F MED LIST DOCD IN RCRD: CPT | Mod: CPTII,S$GLB,, | Performed by: PODIATRIST

## 2022-09-16 PROCEDURE — 3008F PR BODY MASS INDEX (BMI) DOCUMENTED: ICD-10-PCS | Mod: CPTII,S$GLB,, | Performed by: PODIATRIST

## 2022-09-16 PROCEDURE — 20550 NJX 1 TENDON SHEATH/LIGAMENT: CPT | Mod: LT,S$GLB,, | Performed by: PODIATRIST

## 2022-09-16 PROCEDURE — 3044F PR MOST RECENT HEMOGLOBIN A1C LEVEL <7.0%: ICD-10-PCS | Mod: CPTII,S$GLB,, | Performed by: PODIATRIST

## 2022-09-16 PROCEDURE — 99214 OFFICE O/P EST MOD 30 MIN: CPT | Mod: 25,S$GLB,, | Performed by: PODIATRIST

## 2022-09-16 PROCEDURE — 99214 PR OFFICE/OUTPT VISIT, EST, LEVL IV, 30-39 MIN: ICD-10-PCS | Mod: 25,S$GLB,, | Performed by: PODIATRIST

## 2022-09-16 PROCEDURE — 1160F RVW MEDS BY RX/DR IN RCRD: CPT | Mod: CPTII,S$GLB,, | Performed by: PODIATRIST

## 2022-09-16 PROCEDURE — 99999 PR PBB SHADOW E&M-EST. PATIENT-LVL III: CPT | Mod: PBBFAC,,, | Performed by: PODIATRIST

## 2022-09-16 PROCEDURE — 20550 PR INJECT TENDON SHEATH/LIGAMENT: ICD-10-PCS | Mod: LT,S$GLB,, | Performed by: PODIATRIST

## 2022-09-16 PROCEDURE — 1159F PR MEDICATION LIST DOCUMENTED IN MEDICAL RECORD: ICD-10-PCS | Mod: CPTII,S$GLB,, | Performed by: PODIATRIST

## 2022-09-16 PROCEDURE — 99999 PR PBB SHADOW E&M-EST. PATIENT-LVL III: ICD-10-PCS | Mod: PBBFAC,,, | Performed by: PODIATRIST

## 2022-09-16 PROCEDURE — 3008F BODY MASS INDEX DOCD: CPT | Mod: CPTII,S$GLB,, | Performed by: PODIATRIST

## 2022-09-16 RX ORDER — IBUPROFEN 800 MG/1
800 TABLET ORAL 2 TIMES DAILY
Qty: 60 TABLET | Refills: 0 | Status: SHIPPED | OUTPATIENT
Start: 2022-09-16 | End: 2022-10-25

## 2022-09-16 RX ORDER — TRIAMCINOLONE ACETONIDE 40 MG/ML
40 INJECTION, SUSPENSION INTRA-ARTICULAR; INTRAMUSCULAR ONCE
Status: COMPLETED | OUTPATIENT
Start: 2022-09-16 | End: 2022-09-16

## 2022-09-16 RX ORDER — KETOROLAC TROMETHAMINE 10 MG/1
10 TABLET, FILM COATED ORAL EVERY 6 HOURS
Qty: 20 TABLET | Refills: 0 | Status: SHIPPED | OUTPATIENT
Start: 2022-09-16 | End: 2022-09-21

## 2022-09-16 RX ORDER — DEXAMETHASONE SODIUM PHOSPHATE 4 MG/ML
4 INJECTION, SOLUTION INTRA-ARTICULAR; INTRALESIONAL; INTRAMUSCULAR; INTRAVENOUS; SOFT TISSUE ONCE
Status: COMPLETED | OUTPATIENT
Start: 2022-09-16 | End: 2022-09-16

## 2022-09-16 RX ADMIN — TRIAMCINOLONE ACETONIDE 40 MG: 40 INJECTION, SUSPENSION INTRA-ARTICULAR; INTRAMUSCULAR at 11:09

## 2022-09-16 RX ADMIN — DEXAMETHASONE SODIUM PHOSPHATE 4 MG: 4 INJECTION, SOLUTION INTRA-ARTICULAR; INTRALESIONAL; INTRAMUSCULAR; INTRAVENOUS; SOFT TISSUE at 11:09

## 2022-09-16 NOTE — PROGRESS NOTES
Subjective:       Patient ID: Aggie Horton is a 42 y.o. female.    Chief Complaint: Heel Pain (Left heel pain when walking on it, 7/10 pain at present, non-diabetic, pcp )      HPI: Aggie Horton presents to the office today, for follow-up concerning plantar fasciitis of the left foot. At this time, pains are 9/10 and are described as achy and sharp in nature. States analgic gait pattern. States walking and standing is very painful. To this juncture, patient has had 1 cortisone injections to the left foot. States seldom NSAIDs. Patient's Primary Care Provider is Leslie Umaña MD.     Review of patient's allergies indicates:   Allergen Reactions    Azithromycin        Past Medical History:   Diagnosis Date    Encounter for cholecystectomy     Herpes     Hypertension     Insulin resistance     KELLY on CPAP        Family History   Problem Relation Age of Onset    Hypertension Mother     Hypothyroidism Mother     Hypertension Father     Diabetes Father     No Known Problems Brother     No Known Problems Brother     Colon cancer Maternal Grandmother     Breast cancer Maternal Grandmother     Breast cancer Paternal Grandmother     Heart disease Neg Hx     Stroke Neg Hx        Social History     Socioeconomic History    Marital status: Single    Number of children: 0   Occupational History    Occupation:      Comment: [employer]   Tobacco Use    Smoking status: Never    Smokeless tobacco: Never   Substance and Sexual Activity    Alcohol use: Yes     Comment: occasionally    Drug use: No    Sexual activity: Not Currently     Partners: Male     Birth control/protection: None     Comment: 1 partner   Social History Narrative    She wears seatbelt.     Social Determinants of Health     Physical Activity: Insufficiently Active    Days of Exercise per Week: 2 days    Minutes of Exercise per Session: 30 min       Past Surgical History:   Procedure Laterality Date    CHOLECYSTECTOMY    "      Review of Systems   Constitutional:  Negative for chills, fatigue and fever.   HENT:  Negative for hearing loss.    Eyes:  Negative for photophobia and visual disturbance.   Respiratory:  Negative for cough, chest tightness, shortness of breath and wheezing.    Cardiovascular:  Negative for chest pain and palpitations.   Gastrointestinal:  Negative for constipation, diarrhea, nausea and vomiting.   Endocrine: Negative for cold intolerance and heat intolerance.   Genitourinary:  Negative for flank pain.   Musculoskeletal:  Positive for gait problem. Negative for neck pain and neck stiffness.   Neurological:  Negative for light-headedness and headaches.   Psychiatric/Behavioral:  Negative for sleep disturbance.        Objective:   Ht 5' 5.5" (1.664 m)   Wt (!) 169.2 kg (373 lb 0.3 oz)   LMP 08/18/2022 (Exact Date) Comment: Monthly  BMI 61.13 kg/m²         Physical Exam    LOWER EXTREMITY PHYSICAL EXAMINATION    NEUROLOGY: Negative Tinel's Sign and negative Valleix Sign. No neurological sensations with compression of the area of Coelho's Nerve in the area of the Abductor Hallucis muscle belly.    ORTHOPEDIC: Thre is moderate to severe tenderness to palpation of the area around the plantar medial calcaneal tubercle on the left foot. Pains are characterized as sharp and stabbing-like with direct palpation of the area. There is also mild pain to palpation of the immediate plantar aspect of the heel, and no pains to the lateral band of the fascia. No edema is noted. No fullness is noted. No pains or defects are noted within the plantar fascia at the arch. No plantar fibromas are noted. No defects are noted within the ligament. No pains with medial to lateral compression of the heel. Equinus contracture is noted. Mildly antalgic gait pattern is noted.     Assessment:     1. Pain of left heel    2. Plantar fasciitis    3. Contracture, left ankle    4. Morbid obesity          Plan:     Pain of left heel    Plantar " fasciitis  -     triamcinolone acetonide injection 40 mg  -     dexamethasone injection 4 mg    Contracture, left ankle    Morbid obesity    Thorough discussion is had with the patient today, concerning the diagnosis, its etiology, and the treatment algorithm at present.     Following sterile preparation with alcohol swab and/or betadine paint, injection was given into and around the area of the left plantar medial calcaneal tubercle, using 25-gauge needle. Injection consisted of approximately 0.5cc of Dexamethasone Sodium Phosphate, 0.5cc of Kenalog-40 and 0.5cc of 0.50% Marcaine w/ Epinephrine. Bandage application thereafter. Patient tolerated procedure well, and without complications or complaints. Patient educated that the area of pathology, might actually be slightly more painful, due to the injection, over the course of the next one to two days.     Patient is counseled and reminded concerning the importance of good nutrition and healthy eating habits, which may include blood sugar control, to prevent and/or help podiatric foot and ankle complications.    Did discuss proper and supportive shoe gear in detail and at length with the patient.  These are shoes with firm and robust arch support; medial counter.  Shoes which only bend at the metatarsophalangeal joint and which are rigid in the midfoot and hindfoot. Patient urged to purchase running type or cross training type shoes gear which are designed for pronation control.              Future Appointments   Date Time Provider Department Center   1/16/2023 10:40 AM [facility]  MAMMO1 SCREEN [facility] [place] [facility]   1/16/2023 11:20 AM Cadence Hernandez MD [facility]BGYN [place] [facility]   8/22/2023  1:00 PM Leslie Umaña MD [facility]

## 2022-09-21 ENCOUNTER — PATIENT MESSAGE (OUTPATIENT)
Dept: FAMILY MEDICINE | Facility: CLINIC | Age: 42
End: 2022-09-21
Payer: COMMERCIAL

## 2022-09-29 ENCOUNTER — TELEPHONE (OUTPATIENT)
Dept: FAMILY MEDICINE | Facility: CLINIC | Age: 42
End: 2022-09-29
Payer: COMMERCIAL

## 2022-09-29 DIAGNOSIS — I10 ESSENTIAL HYPERTENSION: Primary | ICD-10-CM

## 2022-09-29 NOTE — TELEPHONE ENCOUNTER
Advise pt form is complete based on 2/22/22 physical with labs EXCEPT she did not have cholesterol drawn which is needed for her form.      Order for cholesterol is in and can be faxed to [facility]. She can go to [facility] today so I can get result tomorrow to include with form.    Thanks.

## 2022-09-29 NOTE — TELEPHONE ENCOUNTER
"Pt portal message    " Good morning, I hope all is well. I just wanted to send a reminder that the paperwork is due to my employer by Friday, 9/30/2022 at 5:00pm. "    Please advise        "

## 2022-09-29 NOTE — TELEPHONE ENCOUNTER
Pt notified of labs sent to lab [facility] and for her to get them done so that labs can be completed

## 2022-09-29 NOTE — TELEPHONE ENCOUNTER
So, form would have already been completed and faxed. If nursing staff not sure of this, re-print form and get to me to complete ASAP. Thanks.

## 2022-09-30 ENCOUNTER — TELEPHONE (OUTPATIENT)
Dept: FAMILY MEDICINE | Facility: CLINIC | Age: 42
End: 2022-09-30
Payer: COMMERCIAL

## 2022-09-30 LAB
CHOLEST SERPL-MCNC: 178 MG/DL (ref 100–199)
HDLC SERPL-MCNC: 61 MG/DL
LDLC SERPL CALC-MCNC: 96 MG/DL (ref 0–99)
TRIGL SERPL-MCNC: 121 MG/DL (ref 0–149)
VLDLC SERPL CALC-MCNC: 21 MG/DL (ref 5–40)

## 2022-09-30 NOTE — TELEPHONE ENCOUNTER
Call patient NOW - ask her for waist in inches and put on form. Then fax appropriately before 430 PM today.    Form is at desk.    Thanks.

## 2022-09-30 NOTE — TELEPHONE ENCOUNTER
"Pt portal message    Labs at desk     " Good Afternoon, I was able to complete the bloodwork on yesterday. I just wanted to make sure the results have been received from [facility]. Thank you so much."    Please advise    "

## 2022-11-09 ENCOUNTER — TELEPHONE (OUTPATIENT)
Dept: FAMILY MEDICINE | Facility: CLINIC | Age: 42
End: 2022-11-09
Payer: COMMERCIAL

## 2022-11-09 ENCOUNTER — PATIENT OUTREACH (OUTPATIENT)
Dept: ADMINISTRATIVE | Facility: HOSPITAL | Age: 42
End: 2022-11-09
Payer: COMMERCIAL

## 2022-11-09 VITALS — DIASTOLIC BLOOD PRESSURE: 97 MMHG | SYSTOLIC BLOOD PRESSURE: 147 MMHG

## 2022-11-09 NOTE — PROGRESS NOTES
HTN Report: Patient states her home BP reading on 11/8/22 was 147/97. Offered to schedule nurse visit to have BP checked, patient declined. Patient states she had many other doctor appointments that her BP will be checked and will continue to monitor at home. Remote BP will be entered.

## 2022-12-16 ENCOUNTER — OFFICE VISIT (OUTPATIENT)
Dept: PODIATRY | Facility: CLINIC | Age: 42
End: 2022-12-16
Payer: COMMERCIAL

## 2022-12-16 VITALS — HEIGHT: 65 IN | BODY MASS INDEX: 48.82 KG/M2 | WEIGHT: 293 LBS

## 2022-12-16 DIAGNOSIS — M72.2 PLANTAR FASCIITIS: Primary | ICD-10-CM

## 2022-12-16 DIAGNOSIS — E66.01 MORBID OBESITY: ICD-10-CM

## 2022-12-16 DIAGNOSIS — M79.672 PAIN OF LEFT HEEL: ICD-10-CM

## 2022-12-16 DIAGNOSIS — M24.572 CONTRACTURE, LEFT ANKLE: ICD-10-CM

## 2022-12-16 PROCEDURE — 1160F PR REVIEW ALL MEDS BY PRESCRIBER/CLIN PHARMACIST DOCUMENTED: ICD-10-PCS | Mod: CPTII,S$GLB,, | Performed by: PODIATRIST

## 2022-12-16 PROCEDURE — 20550 NJX 1 TENDON SHEATH/LIGAMENT: CPT | Mod: LT,S$GLB,, | Performed by: PODIATRIST

## 2022-12-16 PROCEDURE — 1159F MED LIST DOCD IN RCRD: CPT | Mod: CPTII,S$GLB,, | Performed by: PODIATRIST

## 2022-12-16 PROCEDURE — 99213 PR OFFICE/OUTPT VISIT, EST, LEVL III, 20-29 MIN: ICD-10-PCS | Mod: 25,S$GLB,, | Performed by: PODIATRIST

## 2022-12-16 PROCEDURE — 3044F PR MOST RECENT HEMOGLOBIN A1C LEVEL <7.0%: ICD-10-PCS | Mod: CPTII,S$GLB,, | Performed by: PODIATRIST

## 2022-12-16 PROCEDURE — 3008F BODY MASS INDEX DOCD: CPT | Mod: CPTII,S$GLB,, | Performed by: PODIATRIST

## 2022-12-16 PROCEDURE — 1160F RVW MEDS BY RX/DR IN RCRD: CPT | Mod: CPTII,S$GLB,, | Performed by: PODIATRIST

## 2022-12-16 PROCEDURE — 99999 PR PBB SHADOW E&M-EST. PATIENT-LVL III: ICD-10-PCS | Mod: PBBFAC,,, | Performed by: PODIATRIST

## 2022-12-16 PROCEDURE — 3008F PR BODY MASS INDEX (BMI) DOCUMENTED: ICD-10-PCS | Mod: CPTII,S$GLB,, | Performed by: PODIATRIST

## 2022-12-16 PROCEDURE — 3044F HG A1C LEVEL LT 7.0%: CPT | Mod: CPTII,S$GLB,, | Performed by: PODIATRIST

## 2022-12-16 PROCEDURE — 1159F PR MEDICATION LIST DOCUMENTED IN MEDICAL RECORD: ICD-10-PCS | Mod: CPTII,S$GLB,, | Performed by: PODIATRIST

## 2022-12-16 PROCEDURE — 99213 OFFICE O/P EST LOW 20 MIN: CPT | Mod: 25,S$GLB,, | Performed by: PODIATRIST

## 2022-12-16 PROCEDURE — 20550 PR INJECT TENDON SHEATH/LIGAMENT: ICD-10-PCS | Mod: LT,S$GLB,, | Performed by: PODIATRIST

## 2022-12-16 PROCEDURE — 99999 PR PBB SHADOW E&M-EST. PATIENT-LVL III: CPT | Mod: PBBFAC,,, | Performed by: PODIATRIST

## 2022-12-16 RX ORDER — MINOCYCLINE HYDROCHLORIDE 100 MG/1
100 CAPSULE ORAL 2 TIMES DAILY
COMMUNITY
Start: 2022-10-27 | End: 2023-08-22

## 2022-12-16 RX ORDER — DEXAMETHASONE SODIUM PHOSPHATE 4 MG/ML
4 INJECTION, SOLUTION INTRA-ARTICULAR; INTRALESIONAL; INTRAMUSCULAR; INTRAVENOUS; SOFT TISSUE ONCE
Status: COMPLETED | OUTPATIENT
Start: 2022-12-16 | End: 2022-12-16

## 2022-12-16 RX ORDER — TRIAMCINOLONE ACETONIDE 40 MG/ML
40 INJECTION, SUSPENSION INTRA-ARTICULAR; INTRAMUSCULAR ONCE
Status: COMPLETED | OUTPATIENT
Start: 2022-12-16 | End: 2022-12-16

## 2022-12-16 RX ORDER — HYDROCORTISONE 25 MG/G
CREAM TOPICAL
COMMUNITY
Start: 2022-10-28

## 2022-12-16 RX ADMIN — DEXAMETHASONE SODIUM PHOSPHATE 4 MG: 4 INJECTION, SOLUTION INTRA-ARTICULAR; INTRALESIONAL; INTRAMUSCULAR; INTRAVENOUS; SOFT TISSUE at 12:12

## 2022-12-16 RX ADMIN — TRIAMCINOLONE ACETONIDE 40 MG: 40 INJECTION, SUSPENSION INTRA-ARTICULAR; INTRAMUSCULAR at 12:12

## 2022-12-16 NOTE — PROGRESS NOTES
Subjective:       Patient ID: Aggie Horton is a 42 y.o. female.    Chief Complaint: Foot Problem (Inj f/u. 7/10 left heel pain. Non diabetic PCP: Dr. Umaña last seen 08/22/22)        HPI: Aggie Horton presents to the office today, for follow-up concerning plantar fasciitis of the left foot. At this time, pains are 7/10 and are described as achy and sharp in nature. States analgic gait pattern. States walking and standing is very painful. To this juncture, patient has had 2 cortisone injections to the left foot. States NSAIDs. Patient's Primary Care Provider is Leslie Umaña MD.     Review of patient's allergies indicates:   Allergen Reactions    Azithromycin        Past Medical History:   Diagnosis Date    Encounter for cholecystectomy     Herpes     Hypertension     Insulin resistance     KELLY on CPAP        Family History   Problem Relation Age of Onset    Hypertension Mother     Hypothyroidism Mother     Hypertension Father     Diabetes Father     No Known Problems Brother     No Known Problems Brother     Colon cancer Maternal Grandmother     Breast cancer Maternal Grandmother     Breast cancer Paternal Grandmother     Heart disease Neg Hx     Stroke Neg Hx        Social History     Socioeconomic History    Marital status: Single    Number of children: 0   Occupational History    Occupation:      Comment: [employer]   Tobacco Use    Smoking status: Never    Smokeless tobacco: Never   Substance and Sexual Activity    Alcohol use: Yes     Comment: occasionally    Drug use: No    Sexual activity: Not Currently     Partners: Male     Birth control/protection: None     Comment: 1 partner   Social History Narrative    She wears seatbelt.     Social Determinants of Health     Physical Activity: Insufficiently Active    Days of Exercise per Week: 2 days    Minutes of Exercise per Session: 30 min       Past Surgical History:   Procedure Laterality Date    CHOLECYSTECTOMY         Review  "of Systems   Constitutional:  Negative for chills, fatigue and fever.   HENT:  Negative for hearing loss.    Eyes:  Negative for photophobia and visual disturbance.   Respiratory:  Negative for cough, chest tightness, shortness of breath and wheezing.    Cardiovascular:  Negative for chest pain and palpitations.   Gastrointestinal:  Negative for constipation, diarrhea, nausea and vomiting.   Endocrine: Negative for cold intolerance and heat intolerance.   Genitourinary:  Negative for flank pain.   Musculoskeletal:  Positive for gait problem. Negative for neck pain and neck stiffness.   Neurological:  Negative for light-headedness and headaches.   Psychiatric/Behavioral:  Negative for sleep disturbance.        Objective:   Ht 5' 5" (1.651 m)   Wt (!) 169.2 kg (373 lb 0.3 oz)   BMI 62.07 kg/m²         Physical Exam    LOWER EXTREMITY PHYSICAL EXAMINATION    NEUROLOGY: Negative Tinel's Sign and negative Valleix Sign. No neurological sensations with compression of the area of Coelho's Nerve in the area of the Abductor Hallucis muscle belly.    ORTHOPEDIC: Thre is moderate tenderness to palpation of the area around the plantar medial calcaneal tubercle on the left foot. Pains are characterized as sharp and stabbing-like with direct palpation of the area. There is also mild pain to palpation of the immediate plantar aspect of the heel, and no pains to the lateral band of the fascia. No edema is noted. No fullness is noted. No pains or defects are noted within the plantar fascia at the arch. No plantar fibromas are noted. No defects are noted within the ligament. No pains with medial to lateral compression of the heel. Equinus contracture is noted. Mildly antalgic gait pattern is noted.     Assessment:     1. Plantar fasciitis    2. Pain of left heel    3. Contracture, left ankle    4. Morbid obesity            Plan:     Plantar fasciitis  -     dexAMETHasone injection 4 mg  -     triamcinolone acetonide injection 40 " mg    Pain of left heel    Contracture, left ankle    Morbid obesity        Thorough discussion is had with the patient today, concerning the diagnosis, its etiology, and the treatment algorithm at present.     Following sterile preparation with alcohol swab and/or betadine paint, injection was given into and around the area of the left plantar medial calcaneal tubercle, using 25-gauge needle. Injection consisted of approximately 0.5cc of Dexamethasone Sodium Phosphate, 0.5cc of Kenalog-40 and 0.5cc of 0.50% Marcaine w/ Epinephrine. Bandage application thereafter. Patient tolerated procedure well, and without complications or complaints. Patient educated that the area of pathology, might actually be slightly more painful, due to the injection, over the course of the next one to two days.     Patient is counseled and reminded concerning the importance of good nutrition and healthy eating habits, which may include blood sugar control, to prevent and/or help podiatric foot and ankle complications.    Did discuss proper and supportive shoe gear in detail and at length with the patient.  These are shoes with firm and robust arch support; medial counter.  Shoes which only bend at the metatarsophalangeal joint and which are rigid in the midfoot and hindfoot. Patient urged to purchase running type or cross training type shoes gear which are designed for pronation control.              Future Appointments   Date Time Provider Department Center   1/16/2023 10:40 AM [facility]  MAMMO1 SCREEN [facility] [place] [facility]   1/16/2023 11:20 AM Cadence Hernandez MD [facility]YN [place] [facility]   8/22/2023  1:00 PM Leslie Umaña MD [facility]

## 2022-12-19 ENCOUNTER — PATIENT MESSAGE (OUTPATIENT)
Dept: FAMILY MEDICINE | Facility: CLINIC | Age: 42
End: 2022-12-19
Payer: COMMERCIAL

## 2022-12-19 DIAGNOSIS — E88.810 INSULIN RESISTANCE SYNDROME: ICD-10-CM

## 2022-12-19 DIAGNOSIS — R73.03 PREDIABETES: ICD-10-CM

## 2022-12-19 RX ORDER — ORAL SEMAGLUTIDE 3 MG/1
3 TABLET ORAL DAILY
Qty: 30 TABLET | Refills: 0 | Status: SHIPPED | OUTPATIENT
Start: 2022-12-19 | End: 2023-02-20

## 2022-12-19 NOTE — TELEPHONE ENCOUNTER
Care Due:                  Date            Visit Type   Department     Provider  --------------------------------------------------------------------------------                                EP -                              PRIMARY      [facility]  Last Visit: 08-      CARE ([facility])   MEDICINE       Leslie Umaña                              EP -                              PRIMARY      [facility]  Next Visit: 08-      CARE ([facility])   MEDICINE       Leslie Umaña                                                            Last  Test          Frequency    Reason                     Performed    Due Date  --------------------------------------------------------------------------------    CMP.........  12 months..  hydroCHLOROthiazide......  02- 02-    HBA1C.......  6 months...  semaglutide..............  02- 08-    [facility] Embedded Care Gaps. Reference number: [id number]. 12/19/2022   1:03:03 PM CST

## 2023-01-03 ENCOUNTER — TELEPHONE (OUTPATIENT)
Dept: PODIATRY | Facility: CLINIC | Age: 43
End: 2023-01-03
Payer: COMMERCIAL

## 2023-01-06 ENCOUNTER — OFFICE VISIT (OUTPATIENT)
Dept: PODIATRY | Facility: CLINIC | Age: 43
End: 2023-01-06
Payer: COMMERCIAL

## 2023-01-06 VITALS — BODY MASS INDEX: 48.82 KG/M2 | HEIGHT: 65 IN | WEIGHT: 293 LBS

## 2023-01-06 DIAGNOSIS — M79.672 PAIN OF LEFT HEEL: ICD-10-CM

## 2023-01-06 DIAGNOSIS — E66.01 MORBID OBESITY: ICD-10-CM

## 2023-01-06 DIAGNOSIS — M72.2 PLANTAR FASCIITIS: Primary | ICD-10-CM

## 2023-01-06 DIAGNOSIS — M24.572 CONTRACTURE, LEFT ANKLE: ICD-10-CM

## 2023-01-06 PROCEDURE — 99213 PR OFFICE/OUTPT VISIT, EST, LEVL III, 20-29 MIN: ICD-10-PCS | Mod: 25,S$GLB,, | Performed by: PODIATRIST

## 2023-01-06 PROCEDURE — 20550 PR INJECT TENDON SHEATH/LIGAMENT: ICD-10-PCS | Mod: LT,S$GLB,, | Performed by: PODIATRIST

## 2023-01-06 PROCEDURE — 99999 PR PBB SHADOW E&M-EST. PATIENT-LVL III: CPT | Mod: PBBFAC,,, | Performed by: PODIATRIST

## 2023-01-06 PROCEDURE — 3008F BODY MASS INDEX DOCD: CPT | Mod: CPTII,S$GLB,, | Performed by: PODIATRIST

## 2023-01-06 PROCEDURE — 3008F PR BODY MASS INDEX (BMI) DOCUMENTED: ICD-10-PCS | Mod: CPTII,S$GLB,, | Performed by: PODIATRIST

## 2023-01-06 PROCEDURE — 1159F MED LIST DOCD IN RCRD: CPT | Mod: CPTII,S$GLB,, | Performed by: PODIATRIST

## 2023-01-06 PROCEDURE — 1160F RVW MEDS BY RX/DR IN RCRD: CPT | Mod: CPTII,S$GLB,, | Performed by: PODIATRIST

## 2023-01-06 PROCEDURE — 20550 NJX 1 TENDON SHEATH/LIGAMENT: CPT | Mod: LT,S$GLB,, | Performed by: PODIATRIST

## 2023-01-06 PROCEDURE — 1160F PR REVIEW ALL MEDS BY PRESCRIBER/CLIN PHARMACIST DOCUMENTED: ICD-10-PCS | Mod: CPTII,S$GLB,, | Performed by: PODIATRIST

## 2023-01-06 PROCEDURE — 99213 OFFICE O/P EST LOW 20 MIN: CPT | Mod: 25,S$GLB,, | Performed by: PODIATRIST

## 2023-01-06 PROCEDURE — 99999 PR PBB SHADOW E&M-EST. PATIENT-LVL III: ICD-10-PCS | Mod: PBBFAC,,, | Performed by: PODIATRIST

## 2023-01-06 PROCEDURE — 1159F PR MEDICATION LIST DOCUMENTED IN MEDICAL RECORD: ICD-10-PCS | Mod: CPTII,S$GLB,, | Performed by: PODIATRIST

## 2023-01-06 RX ORDER — TRIAMCINOLONE ACETONIDE 40 MG/ML
40 INJECTION, SUSPENSION INTRA-ARTICULAR; INTRAMUSCULAR ONCE
Status: COMPLETED | OUTPATIENT
Start: 2023-01-06 | End: 2023-01-06

## 2023-01-06 RX ORDER — DEXAMETHASONE SODIUM PHOSPHATE 4 MG/ML
4 INJECTION, SOLUTION INTRA-ARTICULAR; INTRALESIONAL; INTRAMUSCULAR; INTRAVENOUS; SOFT TISSUE ONCE
Status: COMPLETED | OUTPATIENT
Start: 2023-01-06 | End: 2023-01-06

## 2023-01-06 RX ADMIN — DEXAMETHASONE SODIUM PHOSPHATE 4 MG: 4 INJECTION, SOLUTION INTRA-ARTICULAR; INTRALESIONAL; INTRAMUSCULAR; INTRAVENOUS; SOFT TISSUE at 12:01

## 2023-01-06 RX ADMIN — TRIAMCINOLONE ACETONIDE 40 MG: 40 INJECTION, SUSPENSION INTRA-ARTICULAR; INTRAMUSCULAR at 12:01

## 2023-01-06 NOTE — PROGRESS NOTES
Subjective:       Patient ID: Aggie Horton is a 42 y.o. female.    Chief Complaint: Plantar Fasciitis (Left ft 3/10 Pain Non Diabetic PCP: Leslie Umaña MD last seen 08/22/2022)        HPI: Aggie Horton presents to the office today, for follow-up concerning plantar fasciitis of the left foot. At this time, pains are 3/10 and are described as achy and sharp in nature. States analgic gait pattern. States walking and standing is not as painful as compared to prior. To this juncture, patient has had 3 cortisone injections to the left foot. States NSAIDs. Patient's Primary Care Provider is Leslie Umaña MD.     Review of patient's allergies indicates:   Allergen Reactions    Azithromycin        Past Medical History:   Diagnosis Date    Encounter for cholecystectomy     Herpes     Hypertension     Insulin resistance     KELLY on CPAP        Family History   Problem Relation Age of Onset    Hypertension Mother     Hypothyroidism Mother     Hypertension Father     Diabetes Father     No Known Problems Brother     No Known Problems Brother     Colon cancer Maternal Grandmother     Breast cancer Maternal Grandmother     Breast cancer Paternal Grandmother     Heart disease Neg Hx     Stroke Neg Hx        Social History     Socioeconomic History    Marital status: Single    Number of children: 0   Occupational History    Occupation:      Comment: [employer]   Tobacco Use    Smoking status: Never    Smokeless tobacco: Never   Substance and Sexual Activity    Alcohol use: Yes     Comment: occasionally    Drug use: No    Sexual activity: Not Currently     Partners: Male     Birth control/protection: None     Comment: 1 partner   Social History Narrative    She wears seatbelt.     Social Determinants of Health     Physical Activity: Insufficiently Active    Days of Exercise per Week: 2 days    Minutes of Exercise per Session: 30 min       Past Surgical History:   Procedure Laterality Date     "CHOLECYSTECTOMY         Review of Systems   Constitutional:  Negative for chills, fatigue and fever.   HENT:  Negative for hearing loss.    Eyes:  Negative for photophobia and visual disturbance.   Respiratory:  Negative for cough, chest tightness, shortness of breath and wheezing.    Cardiovascular:  Negative for chest pain and palpitations.   Gastrointestinal:  Negative for constipation, diarrhea, nausea and vomiting.   Endocrine: Negative for cold intolerance and heat intolerance.   Genitourinary:  Negative for flank pain.   Musculoskeletal:  Positive for gait problem. Negative for neck pain and neck stiffness.   Neurological:  Negative for light-headedness and headaches.   Psychiatric/Behavioral:  Negative for sleep disturbance.        Objective:   Ht 5' 5" (1.651 m)   Wt (!) 169.2 kg (373 lb 0.3 oz)   BMI 62.07 kg/m²         Physical Exam    LOWER EXTREMITY PHYSICAL EXAMINATION    ORTHOPEDIC: Thre is mild tenderness to palpation of the area around the plantar medial calcaneal tubercle on the left foot. Pains are characterized as sharp and stabbing-like with direct palpation of the area. There is also mild pain to palpation of the immediate plantar aspect of the heel, and no pains to the lateral band of the fascia. No edema is noted. No fullness is noted. No pains or defects are noted within the plantar fascia at the arch. No plantar fibromas are noted. No defects are noted within the ligament. No pains with medial to lateral compression of the heel. Equinus contracture is noted. Mildly antalgic gait pattern is noted.     Assessment:     1. Plantar fasciitis    2. Pain of left heel    3. Contracture, left ankle    4. Morbid obesity            Plan:     Plantar fasciitis  -     triamcinolone acetonide injection 40 mg  -     dexAMETHasone injection 4 mg    Pain of left heel    Contracture, left ankle    Morbid obesity      Thorough discussion is had with the patient today, concerning the diagnosis, its etiology, " and the treatment algorithm at present.     Following sterile preparation with alcohol swab and/or betadine paint, injection was given into and around the area of the left plantar medial calcaneal tubercle, using 25-gauge needle. Injection consisted of approximately 0.5cc of Dexamethasone Sodium Phosphate, 0.5cc of Kenalog-40 and 0.5cc of 0.50% Marcaine w/ Epinephrine. Bandage application thereafter. Patient tolerated procedure well, and without complications or complaints. Patient educated that the area of pathology, might actually be slightly more painful, due to the injection, over the course of the next one to two days.     Patient is counseled and reminded concerning the importance of good nutrition and healthy eating habits, which may include blood sugar control, to prevent and/or help podiatric foot and ankle complications.    Did discuss proper and supportive shoe gear in detail and at length with the patient.  These are shoes with firm and robust arch support; medial counter.  Shoes which only bend at the metatarsophalangeal joint and which are rigid in the midfoot and hindfoot. Patient urged to purchase running type or cross training type shoes gear which are designed for pronation control.              Future Appointments   Date Time Provider Department Center   1/16/2023 10:40 AM [facility]  MAMMO1 SCREEN [facility]MO [place] [facility]   1/16/2023 11:20 AM Cadence Hernandez MD [facility]BGYN [place] [facility]   8/22/2023  1:00 PM Leslie Umaña MD [facility]

## 2023-01-13 ENCOUNTER — PATIENT MESSAGE (OUTPATIENT)
Dept: PRIMARY CARE CLINIC | Facility: CLINIC | Age: 43
End: 2023-01-13
Payer: COMMERCIAL

## 2023-01-24 ENCOUNTER — PATIENT OUTREACH (OUTPATIENT)
Dept: ADMINISTRATIVE | Facility: HOSPITAL | Age: 43
End: 2023-01-24
Payer: COMMERCIAL

## 2023-01-24 ENCOUNTER — PATIENT MESSAGE (OUTPATIENT)
Dept: ADMINISTRATIVE | Facility: HOSPITAL | Age: 43
End: 2023-01-24
Payer: COMMERCIAL

## 2023-01-24 NOTE — PROGRESS NOTES
WORK BP HTN 01.23.23: Attempted to contact pt in regards to remote bp or schedule NV bp check, no ans, lvm.  Will send message to pt portal.

## 2023-02-19 DIAGNOSIS — E88.810 INSULIN RESISTANCE SYNDROME: ICD-10-CM

## 2023-02-19 DIAGNOSIS — R73.03 PREDIABETES: ICD-10-CM

## 2023-02-19 NOTE — TELEPHONE ENCOUNTER
Care Due:                  Date            Visit Type   Department     Provider  --------------------------------------------------------------------------------                                EP -                              PRIMARY      [facility]  Last Visit: 08-      CARE ([facility])   MEDICINE       Leslie Umaña                              EP -                              PRIMARY      [facility]  Next Visit: 08-      CARE ([facility])   MEDICINE       Leslie Umaña                                                            Last  Test          Frequency    Reason                     Performed    Due Date  --------------------------------------------------------------------------------    CMP.........  12 months..  hydroCHLOROthiazide......  02- 02-    HBA1C.......  6 months...  semaglutide..............  02- 08-    Health [facility] Embedded Care Gaps. Reference number: [id number]. 2/19/2023   2:57:33 PM CST

## 2023-02-20 RX ORDER — ORAL SEMAGLUTIDE 3 MG/1
TABLET ORAL
Qty: 30 TABLET | Refills: 0 | Status: SHIPPED | OUTPATIENT
Start: 2023-02-20 | End: 2024-02-27

## 2023-02-20 NOTE — TELEPHONE ENCOUNTER
Refill Routing Note   Medication(s) are not appropriate for processing by [facility] for the following reason(s):       Required labs outdated    [facility] action(s):  Defer   Requires labs : Yes    Medication Therapy Plan: FOVS    Appointments  past 12m or future 3m with PCP    Date Provider   Last Visit   8/22/2022 Leslie Umaña MD   Next Visit   Visit date not found Leslie Umaña MD   ED visits in past 90 days: 0        Note composed:10:32 PM 02/19/2023

## 2023-02-21 ENCOUNTER — OFFICE VISIT (OUTPATIENT)
Dept: PODIATRY | Facility: CLINIC | Age: 43
End: 2023-02-21
Payer: COMMERCIAL

## 2023-02-21 DIAGNOSIS — E66.01 MORBID OBESITY: ICD-10-CM

## 2023-02-21 DIAGNOSIS — M72.2 PLANTAR FASCIITIS: Primary | ICD-10-CM

## 2023-02-21 DIAGNOSIS — M79.672 PAIN IN LEFT FOOT: ICD-10-CM

## 2023-02-21 DIAGNOSIS — M24.572 CONTRACTURE, LEFT ANKLE: ICD-10-CM

## 2023-02-21 PROCEDURE — 99214 OFFICE O/P EST MOD 30 MIN: CPT | Mod: S$GLB,,, | Performed by: PODIATRIST

## 2023-02-21 PROCEDURE — 1159F PR MEDICATION LIST DOCUMENTED IN MEDICAL RECORD: ICD-10-PCS | Mod: CPTII,S$GLB,, | Performed by: PODIATRIST

## 2023-02-21 PROCEDURE — 99214 PR OFFICE/OUTPT VISIT, EST, LEVL IV, 30-39 MIN: ICD-10-PCS | Mod: S$GLB,,, | Performed by: PODIATRIST

## 2023-02-21 PROCEDURE — 99999 PR PBB SHADOW E&M-EST. PATIENT-LVL III: CPT | Mod: PBBFAC,,, | Performed by: PODIATRIST

## 2023-02-21 PROCEDURE — 99051 MED SERV EVE/WKEND/HOLIDAY: CPT | Mod: S$GLB,,, | Performed by: PODIATRIST

## 2023-02-21 PROCEDURE — 99051 PR MEDICAL SERVICES, EVE/WKEND/HOLIDAY: ICD-10-PCS | Mod: S$GLB,,, | Performed by: PODIATRIST

## 2023-02-21 PROCEDURE — 1160F PR REVIEW ALL MEDS BY PRESCRIBER/CLIN PHARMACIST DOCUMENTED: ICD-10-PCS | Mod: CPTII,S$GLB,, | Performed by: PODIATRIST

## 2023-02-21 PROCEDURE — 99999 PR PBB SHADOW E&M-EST. PATIENT-LVL III: ICD-10-PCS | Mod: PBBFAC,,, | Performed by: PODIATRIST

## 2023-02-21 PROCEDURE — 1159F MED LIST DOCD IN RCRD: CPT | Mod: CPTII,S$GLB,, | Performed by: PODIATRIST

## 2023-02-21 PROCEDURE — 1160F RVW MEDS BY RX/DR IN RCRD: CPT | Mod: CPTII,S$GLB,, | Performed by: PODIATRIST

## 2023-02-21 NOTE — PROGRESS NOTES
Subjective:       Patient ID: Aggie Horton is a 42 y.o. female.    Chief Complaint: Foot Pain (C/o pain left foot, pain level 7, not a diabetic)      HPI: Aggie Horton presents to the office today, for follow-up concerning plantar fasciitis of the left foot. At this time, pains are 7/10 and are described as achy and sharp in nature. States analgic gait pattern. States walking and standing is not as painful as compared to prior, but does exist. To this juncture, patient has had 4 cortisone injections to the left foot. States NSAIDs. Patient's Primary Care Provider is Leslie Umaña MD. Does ambulate 6 or so miles a day in a walking club.     Review of patient's allergies indicates:   Allergen Reactions    Azithromycin        Past Medical History:   Diagnosis Date    Anemia     Encounter for cholecystectomy     Herpes     Hypertension     Insulin resistance     KELLY on CPAP        Family History   Problem Relation Age of Onset    Hypertension Mother     Hypothyroidism Mother     Hypertension Father     Diabetes Father     Osteoarthritis Father     No Known Problems Brother     No Known Problems Brother     Colon cancer Maternal Grandmother     Breast cancer Maternal Grandmother     Breast cancer Paternal Grandmother     Heart disease Neg Hx     Stroke Neg Hx        Social History     Socioeconomic History    Marital status: Single    Number of children: 0   Occupational History    Occupation:      Comment: [employer]   Tobacco Use    Smoking status: Never    Smokeless tobacco: Never   Substance and Sexual Activity    Alcohol use: Yes     Alcohol/week: 1.0 standard drink     Types: 1 Glasses of wine per week     Comment: occasionally    Drug use: No    Sexual activity: Not Currently     Partners: Male     Birth control/protection: None     Comment: 1 partner   Social History Narrative    She wears seatbelt.     Social Determinants of Health     Physical Activity: Insufficiently Active     Days of Exercise per Week: 2 days    Minutes of Exercise per Session: 30 min       Past Surgical History:   Procedure Laterality Date    CHOLECYSTECTOMY         Review of Systems   Constitutional:  Negative for chills, fatigue and fever.   HENT:  Negative for hearing loss.    Eyes:  Negative for photophobia and visual disturbance.   Respiratory:  Negative for cough, chest tightness, shortness of breath and wheezing.    Cardiovascular:  Negative for chest pain and palpitations.   Gastrointestinal:  Negative for constipation, diarrhea, nausea and vomiting.   Endocrine: Negative for cold intolerance and heat intolerance.   Genitourinary:  Negative for flank pain.   Musculoskeletal:  Positive for gait problem. Negative for neck pain and neck stiffness.   Neurological:  Negative for light-headedness and headaches.   Psychiatric/Behavioral:  Negative for sleep disturbance.        Objective:   There were no vitals taken for this visit.    Physical Exam    LOWER EXTREMITY PHYSICAL EXAMINATION  ORTHOPEDIC: Thre is minimal tenderness to palpation of the area around the plantar medial calcaneal tubercle on the left foot. Pains are characterized as sharp and stabbing-like with direct palpation of the area. There is also mild pain to palpation of the immediate plantar aspect of the heel, and no pains to the lateral band of the fascia. No edema is noted. No fullness is noted. No defects are noted within the plantar fascia at the arch. Mild pains at the medial arch of the plantar fascia. No plantar fibromas are noted. No defects are noted within the ligament. No pains with medial to lateral compression of the heel. Equinus contracture is noted. Mildly antalgic gait pattern is noted.     Assessment:     1. Plantar fasciitis    2. Pain in left foot    3. Contracture, left ankle    4. Morbid obesity              Plan:     Plantar fasciitis    Pain in left foot    Contracture, left ankle    Morbid obesity      Thorough discussion  is had with the patient today, concerning the diagnosis, its etiology, and the treatment algorithm at present.     Patient is counseled and reminded concerning the importance of good nutrition and healthy eating habits, which may include blood sugar control, to prevent and/or help podiatric foot and ankle complications.    Did discuss proper and supportive shoe gear in detail and at length with the patient.  These are shoes with firm and robust arch support; medial counter.  Shoes which only bend at the metatarsophalangeal joint and which are rigid in the midfoot and hindfoot. Patient urged to purchase running type or cross training type shoes gear which are designed for pronation control.     Continue PO NSAIDs at 800mg BID to TID.    Start CAM Walker for 2 weeks.    CAM Walker (Walking Boot), short/tall is dispensed to the patient. The CAM Walker is appropriately fitted and customized to the patient's lower extremity physique by the LPN/MA. Patient to ambulate with the CAM Walker at all times. The patient should not sleep with the device or shower with the device, or drive with the device (if dispensed for right ankle/foot pathology).                Future Appointments   Date Time Provider Department Center   6/27/2023  4:20 PM Leslie Umaña MD [facility]   1/22/2024  8:50 AM [facility]  MAMMO1 SCREEN [facility]MO [place] [facility]   1/22/2024  9:50 AM Cadence Hernandez MD [facility]YN [place] [facility]

## 2023-04-04 LAB
ALBUMIN/GLOB SERPL ELPH: 1.4 {RATIO} (ref 1.2–2.2)
ALBUMIN: 3.9 G/DL (ref 3.8–4.8)
ALP SERPL-CCNC: 70 IU/L (ref 44–121)
AST SERPL-CCNC: 18 IU/L (ref 0–40)
BILIRUB SERPL-MCNC: 0.2 MG/DL (ref 0–1.2)
BUN SERPL-MCNC: 10 MG/DL (ref 6–24)
BUN/CREAT RATIO: 9 (ref 9–23)
CALCIUM SERPL-MCNC: 9.7 MG/DL (ref 8.7–10.2)
CHLORIDE SERPL-SCNC: 104 MMOL/L (ref 96–106)
CREAT SERPL-MCNC: 1.1 MG/DL (ref 0.6–1)
EGFR: 68 ML/MIN/1.73
GLOBULIN SER CALC-MCNC: 2.8 G/DL (ref 1.5–4.5)
GLUCOSE SERPL-MCNC: 93 MG/DL (ref 70–99)
HBA1C MFR BLD: 5.8 % (ref 4.8–5.6)
POTASSIUM SERPL-SCNC: 4.1 MMOL/L (ref 3.5–5.2)
PROT SNV-MCNC: 6.7 G/DL (ref 6–8.5)
SODIUM BLD-SCNC: 139 MMOL/L (ref 134–144)

## 2023-05-18 ENCOUNTER — PATIENT MESSAGE (OUTPATIENT)
Dept: FAMILY MEDICINE | Facility: CLINIC | Age: 43
End: 2023-05-18
Payer: COMMERCIAL

## 2023-07-26 ENCOUNTER — PATIENT OUTREACH (OUTPATIENT)
Dept: ADMINISTRATIVE | Facility: HOSPITAL | Age: 43
End: 2023-07-26
Payer: COMMERCIAL

## 2023-08-22 ENCOUNTER — OFFICE VISIT (OUTPATIENT)
Dept: FAMILY MEDICINE | Facility: CLINIC | Age: 43
End: 2023-08-22
Attending: FAMILY MEDICINE
Payer: COMMERCIAL

## 2023-08-22 VITALS
TEMPERATURE: 98 F | OXYGEN SATURATION: 100 % | HEIGHT: 65 IN | WEIGHT: 293 LBS | HEART RATE: 96 BPM | BODY MASS INDEX: 48.82 KG/M2 | SYSTOLIC BLOOD PRESSURE: 140 MMHG | DIASTOLIC BLOOD PRESSURE: 90 MMHG | RESPIRATION RATE: 20 BRPM

## 2023-08-22 DIAGNOSIS — R73.03 PREDIABETES: ICD-10-CM

## 2023-08-22 DIAGNOSIS — E66.01 MORBID OBESITY WITH BMI OF 60.0-69.9, ADULT: ICD-10-CM

## 2023-08-22 DIAGNOSIS — I10 ESSENTIAL HYPERTENSION: ICD-10-CM

## 2023-08-22 DIAGNOSIS — M25.562 LEFT KNEE PAIN, UNSPECIFIED CHRONICITY: ICD-10-CM

## 2023-08-22 DIAGNOSIS — G47.33 OSA ON CPAP: ICD-10-CM

## 2023-08-22 PROCEDURE — 99214 PR OFFICE/OUTPT VISIT, EST, LEVL IV, 30-39 MIN: ICD-10-PCS | Mod: S$GLB,,, | Performed by: FAMILY MEDICINE

## 2023-08-22 PROCEDURE — 3044F HG A1C LEVEL LT 7.0%: CPT | Mod: CPTII,S$GLB,, | Performed by: FAMILY MEDICINE

## 2023-08-22 PROCEDURE — 3080F PR MOST RECENT DIASTOLIC BLOOD PRESSURE >= 90 MM HG: ICD-10-PCS | Mod: CPTII,S$GLB,, | Performed by: FAMILY MEDICINE

## 2023-08-22 PROCEDURE — 1160F PR REVIEW ALL MEDS BY PRESCRIBER/CLIN PHARMACIST DOCUMENTED: ICD-10-PCS | Mod: CPTII,S$GLB,, | Performed by: FAMILY MEDICINE

## 2023-08-22 PROCEDURE — 3044F PR MOST RECENT HEMOGLOBIN A1C LEVEL <7.0%: ICD-10-PCS | Mod: CPTII,S$GLB,, | Performed by: FAMILY MEDICINE

## 2023-08-22 PROCEDURE — 3008F PR BODY MASS INDEX (BMI) DOCUMENTED: ICD-10-PCS | Mod: CPTII,S$GLB,, | Performed by: FAMILY MEDICINE

## 2023-08-22 PROCEDURE — 99999 PR PBB SHADOW E&M-EST. PATIENT-LVL V: ICD-10-PCS | Mod: PBBFAC,,, | Performed by: FAMILY MEDICINE

## 2023-08-22 PROCEDURE — 1160F RVW MEDS BY RX/DR IN RCRD: CPT | Mod: CPTII,S$GLB,, | Performed by: FAMILY MEDICINE

## 2023-08-22 PROCEDURE — 3077F SYST BP >= 140 MM HG: CPT | Mod: CPTII,S$GLB,, | Performed by: FAMILY MEDICINE

## 2023-08-22 PROCEDURE — 1159F MED LIST DOCD IN RCRD: CPT | Mod: CPTII,S$GLB,, | Performed by: FAMILY MEDICINE

## 2023-08-22 PROCEDURE — 1159F PR MEDICATION LIST DOCUMENTED IN MEDICAL RECORD: ICD-10-PCS | Mod: CPTII,S$GLB,, | Performed by: FAMILY MEDICINE

## 2023-08-22 PROCEDURE — 3080F DIAST BP >= 90 MM HG: CPT | Mod: CPTII,S$GLB,, | Performed by: FAMILY MEDICINE

## 2023-08-22 PROCEDURE — 99999 PR PBB SHADOW E&M-EST. PATIENT-LVL V: CPT | Mod: PBBFAC,,, | Performed by: FAMILY MEDICINE

## 2023-08-22 PROCEDURE — 99214 OFFICE O/P EST MOD 30 MIN: CPT | Mod: S$GLB,,, | Performed by: FAMILY MEDICINE

## 2023-08-22 PROCEDURE — 3008F BODY MASS INDEX DOCD: CPT | Mod: CPTII,S$GLB,, | Performed by: FAMILY MEDICINE

## 2023-08-22 PROCEDURE — 3077F PR MOST RECENT SYSTOLIC BLOOD PRESSURE >= 140 MM HG: ICD-10-PCS | Mod: CPTII,S$GLB,, | Performed by: FAMILY MEDICINE

## 2023-08-22 RX ORDER — HYDROCHLOROTHIAZIDE 12.5 MG/1
12.5 TABLET ORAL DAILY
Qty: 90 TABLET | Refills: 2 | Status: SHIPPED | OUTPATIENT
Start: 2023-08-22 | End: 2024-02-27 | Stop reason: SDUPTHER

## 2023-08-22 RX ORDER — LABETALOL 100 MG/1
100 TABLET, FILM COATED ORAL 2 TIMES DAILY
Qty: 180 TABLET | Refills: 2 | Status: SHIPPED | OUTPATIENT
Start: 2023-08-22 | End: 2024-02-27 | Stop reason: SDUPTHER

## 2023-08-22 NOTE — PROGRESS NOTES
Aggie Horton    Chief Complaint   Patient presents with    Hypertension    Follow-up       History of Present Illness:   Ms. Horton comes in today for hypertension.  She states she has not been taking blood pressure medications (labetalol, hydrochlorothiazide) since July 2023 as she states she ran out refills; however, she admits when she had medications she did not take them daily.  She states she performs home blood pressure checks and states blood pressure was 181/100 on yesterday and 151/80 on Sunday.  She states she occasionally has headache with elevated blood pressure; otherwise, she states she usually feels okay.  She states she monitors her diet sometimes but does not exercise.  She states she occasionally drinks alcohol but denies tobacco or illicit drug use.    She states she saw Dr. Linda Mai, neurologist, on August 15, 2023 for migraine headaches at which time her blood pressure was elevated and she was advised to see PCP for further evaluation blood pressure.  She states she has 6-month follow-up scheduled with neurology on February 15, 2024.    She states she has been following with Dr. Herring at [facility] for weight management but states she now follows with Dr. Neil Ortiz with [facility] for possible weight loss surgery.  She states she continues to take Rybelsus without problems.    She complains of having sharp pain in her left knee mainly with standing for the last few weeks.  She denies having associated trauma, swelling, warmth at her knee.  She states she does not take medications for pain.    Otherwise, she denies having fever, chills, fatigue, appetite changes; shortness of breath, cough, wheezing; chest pain, palpitations, leg swelling; abdominal pain, nausea, vomiting, diarrhea, constipation; unusual urinary symptoms; polydipsia, polyphagia, polyuria, hot or cold intolerance; back pain; acute visual changes, numbness; anxiety, depression,  homicidal or suicidal thoughts.      She states she is not fasting today.    Labs:                       WBC                      10.2                02/26/2022                 HGB                      11.8                02/26/2022                 HCT                      39.4                02/26/2022                 PLT                      423                 02/26/2022                 CHOL                     178                 09/29/2022                 TRIG                     121                 09/29/2022                 HDL                      61                  09/29/2022                 ALT                      16                  02/26/2022                 AST                      18                  04/04/2023                 NA                       139                 04/04/2023                 K                        4.1                 04/04/2023                 CL                       104                 04/04/2023                 CREATININE               1.1 (H)             04/04/2023                 BUN                      10                  04/04/2023                 CO2                      19 (L)              02/26/2022                 TSH                      1.210               02/26/2022                 HGBA1C                   5.8 (H)             04/04/2023              LDLCALC                  96                  09/29/2022 01/28/2019  XR KNEE 1 OR 2 VIEW RIGHT   FINDINGS: 2 views of the right knee. No acute fracture or dislocation. No knee joint effusion. Marginal spurring along the medial compartment with mild joint space narrowing.           Current Outpatient Medications   Medication Sig    ammonium lactate 12 % Crea Apply topically twice daily    ferrous sulfate (FEOSOL) 325 mg (65 mg iron) Tab tablet Take 325 mg by mouth daily with breakfast.    hydrocortisone 2.5 % cream APPLY TWICE DAILY TO AFFECTED AREAS ARMPITS AND GROIN WHEN FLARING    ibuprofen  (ADVIL,MOTRIN) 800 MG tablet TAKE 1 TABLET BY MOUTH TWICE A DAY    nortriptyline (PAMELOR) 25 MG capsule Take 25 mg by mouth every evening.    rimegepant (NURTEC) 75 mg odt Take 75 mg by mouth daily as needed.    RYBELSUS 3 mg tablet TAKE 1 TABLET BY MOUTH ONCE DAILY. (Patient taking differently: 7 mg.)    topiramate (TOPAMAX) 100 MG tablet Take 100 mg by mouth.    triamcinolone acetonide 0.1% (KENALOG) 0.1 % ointment Apply topically 2 (two) times daily.    vitamin D 1000 units Tab Take 185 mg by mouth.    hydroCHLOROthiazide (HYDRODIURIL) 12.5 MG Tab Take 1 tablet (12.5 mg total) by mouth once daily. (Patient not taking: Reported on 8/22/2023)    labetaloL (NORMODYNE) 100 MG tablet Take 1 tablet (100 mg total) by mouth 2 (two) times daily. (Patient not taking: Reported on 8/22/2023)       Review of Systems   Constitutional:  Negative for activity change, appetite change, chills, fatigue and fever.        Weight 169.2 kg (373 lb 0.3 oz) at August 22, 2022.   Respiratory:  Negative for cough, shortness of breath and wheezing.    Cardiovascular:  Negative for chest pain, palpitations and leg swelling.        See history of present illness.   Gastrointestinal:  Negative for abdominal pain, constipation, diarrhea, nausea and vomiting.   Endocrine: Negative for cold intolerance, heat intolerance, polydipsia, polyphagia and polyuria.   Genitourinary:  Negative for difficulty urinating.   Musculoskeletal:  Positive for myalgias. Negative for back pain.   Neurological:  Positive for headaches. Negative for numbness.   Psychiatric/Behavioral:  Negative for dysphoric mood and suicidal ideas. The patient is not nervous/anxious.         Negative for homicidal ideas.       Objective:  Physical Exam  Vitals reviewed.   Constitutional:       General: She is not in acute distress.     Appearance: Normal appearance. She is well-developed. She is obese. She is not ill-appearing, toxic-appearing or diaphoretic.      Comments:  Pleasant.   HENT:      Mouth/Throat:      Lips: No lesions.   Neck:      Thyroid: No thyromegaly.   Cardiovascular:      Rate and Rhythm: Normal rate and regular rhythm.      Pulses:           Dorsalis pedis pulses are 3+ on the right side and 3+ on the left side.      Heart sounds: Normal heart sounds. No murmur heard.  Pulmonary:      Effort: Pulmonary effort is normal. No respiratory distress.      Breath sounds: Normal breath sounds. No wheezing or rales.   Abdominal:      General: Bowel sounds are normal. There is no distension.      Palpations: Abdomen is soft. There is no mass.      Tenderness: There is no abdominal tenderness. There is no guarding or rebound.   Musculoskeletal:         General: No swelling or tenderness. Normal range of motion.      Cervical back: Normal range of motion and neck supple. No tenderness.      Comments: She is ambulatory without problems. Non tender knees with full range of motion noted.   Feet:      Right foot:      Protective Sensation: 5 sites tested.  5 sites sensed.      Skin integrity: No ulcer or skin breakdown.      Left foot:      Protective Sensation: 5 sites tested.  5 sites sensed.      Skin integrity: No ulcer or skin breakdown.   Lymphadenopathy:      Cervical: No cervical adenopathy.   Neurological:      General: No focal deficit present.      Mental Status: She is alert and oriented to person, place, and time.   Psychiatric:         Mood and Affect: Mood normal.         Behavior: Behavior normal.         Thought Content: Thought content normal.         Judgment: Judgment normal.       ASSESSMENT:  1. Essential hypertension    2. Prediabetes    3. KELLY on CPAP    4. Left knee pain, unspecified chronicity    5. Morbid obesity with BMI of 60.0-69.9, adult        PLAN:  Aggie was seen today for hypertension and follow-up.    Diagnoses and all orders for this visit:    Essential hypertension  -     labetaloL (NORMODYNE) 100 MG tablet; Take 1 tablet (100 mg total) by  mouth 2 (two) times daily.  -     hydroCHLOROthiazide (HYDRODIURIL) 12.5 MG Tab; Take 1 tablet (12.5 mg total) by mouth once daily.    Prediabetes    KELLY on CPAP    Left knee pain, unspecified chronicity    Morbid obesity with BMI of 60.0-69.9, adult      No labs today.  Continue current medications, follow low sodium, low cholesterol, low carb diet, daily walks.  Prescription refills as noted above.  ADD OTC extra-strength Tylenol 2 pills twice daily prn pain.  Keep follow up with specialists.  Flu shot this fall.  Follow up in about 6 months (around 2/22/2024). But, see me sooner if no improvement or worsening symptoms noted.

## 2024-02-27 ENCOUNTER — OFFICE VISIT (OUTPATIENT)
Dept: FAMILY MEDICINE | Facility: CLINIC | Age: 44
End: 2024-02-27
Attending: FAMILY MEDICINE
Payer: COMMERCIAL

## 2024-02-27 VITALS
OXYGEN SATURATION: 98 % | BODY MASS INDEX: 48.82 KG/M2 | WEIGHT: 293 LBS | TEMPERATURE: 97 F | SYSTOLIC BLOOD PRESSURE: 140 MMHG | HEART RATE: 96 BPM | HEIGHT: 65 IN | DIASTOLIC BLOOD PRESSURE: 80 MMHG

## 2024-02-27 DIAGNOSIS — E55.9 VITAMIN D DEFICIENCY: ICD-10-CM

## 2024-02-27 DIAGNOSIS — G47.33 OSA ON CPAP: ICD-10-CM

## 2024-02-27 DIAGNOSIS — Z00.00 ANNUAL PHYSICAL EXAM: Primary | ICD-10-CM

## 2024-02-27 DIAGNOSIS — G43.009 MIGRAINE WITHOUT AURA AND RESPONSIVE TO TREATMENT: ICD-10-CM

## 2024-02-27 DIAGNOSIS — I10 ESSENTIAL HYPERTENSION: ICD-10-CM

## 2024-02-27 DIAGNOSIS — E88.810 INSULIN RESISTANCE SYNDROME: ICD-10-CM

## 2024-02-27 DIAGNOSIS — R73.03 PREDIABETES: ICD-10-CM

## 2024-02-27 DIAGNOSIS — D50.9 IRON DEFICIENCY ANEMIA, UNSPECIFIED IRON DEFICIENCY ANEMIA TYPE: ICD-10-CM

## 2024-02-27 DIAGNOSIS — E66.01 MORBID OBESITY WITH BMI OF 60.0-69.9, ADULT: ICD-10-CM

## 2024-02-27 PROCEDURE — 1159F MED LIST DOCD IN RCRD: CPT | Mod: CPTII,S$GLB,, | Performed by: FAMILY MEDICINE

## 2024-02-27 PROCEDURE — 99999 PR PBB SHADOW E&M-EST. PATIENT-LVL V: CPT | Mod: PBBFAC,,, | Performed by: FAMILY MEDICINE

## 2024-02-27 PROCEDURE — 99396 PREV VISIT EST AGE 40-64: CPT | Mod: S$GLB,,, | Performed by: FAMILY MEDICINE

## 2024-02-27 PROCEDURE — 3079F DIAST BP 80-89 MM HG: CPT | Mod: CPTII,S$GLB,, | Performed by: FAMILY MEDICINE

## 2024-02-27 PROCEDURE — 3077F SYST BP >= 140 MM HG: CPT | Mod: CPTII,S$GLB,, | Performed by: FAMILY MEDICINE

## 2024-02-27 PROCEDURE — 3008F BODY MASS INDEX DOCD: CPT | Mod: CPTII,S$GLB,, | Performed by: FAMILY MEDICINE

## 2024-02-27 PROCEDURE — 1160F RVW MEDS BY RX/DR IN RCRD: CPT | Mod: CPTII,S$GLB,, | Performed by: FAMILY MEDICINE

## 2024-02-27 RX ORDER — LABETALOL 100 MG/1
100 TABLET, FILM COATED ORAL 2 TIMES DAILY
Qty: 180 TABLET | Refills: 2 | Status: SHIPPED | OUTPATIENT
Start: 2024-02-27 | End: 2025-02-26

## 2024-02-27 RX ORDER — HYDROCHLOROTHIAZIDE 12.5 MG/1
12.5 TABLET ORAL DAILY
Qty: 90 TABLET | Refills: 2 | Status: SHIPPED | OUTPATIENT
Start: 2024-02-27

## 2024-02-27 RX ORDER — ORAL SEMAGLUTIDE 7 MG/1
7 TABLET ORAL DAILY
Qty: 30 TABLET | Refills: 8 | Status: SHIPPED | OUTPATIENT
Start: 2024-02-27

## 2024-02-27 NOTE — PROGRESS NOTES
HISTORY OF PRESENT ILLNESS:  Ms. Horton comes in today nonfasting and without taken medication for annual wellness examination.     END OF LIFE DECISION: She does not have a living will. She does desire life support.    Current Outpatient Medications   Medication Sig    ammonium lactate 12 % Crea Apply topically twice daily    ferrous sulfate (FEOSOL) 325 mg (65 mg iron) Tab tablet Take 325 mg by mouth daily with breakfast.    hydroCHLOROthiazide (HYDRODIURIL) 12.5 MG Tab Take 1 tablet (12.5 mg total) by mouth once daily.    hydrocortisone 2.5 % cream APPLY TWICE DAILY TO AFFECTED AREAS ARMPITS AND GROIN WHEN FLARING    labetaloL (NORMODYNE) 100 MG tablet Take 1 tablet (100 mg total) by mouth 2 (two) times daily.    rimegepant (NURTEC) 75 mg odt Take 75 mg by mouth daily as needed.    RYBELSUS 7 mg tablet     topiramate (TOPAMAX) 100 MG tablet Take 100 mg by mouth.    tranexamic acid (LYSTEDA) 650 mg tablet Take 2 tablets (1,300 mg total) by mouth 3 (three) times daily.    triamcinolone acetonide 0.1% (KENALOG) 0.1 % ointment Apply topically 2 (two) times daily.    vitamin D 1000 units Tab Take 185 mg by mouth.      SCREENINGS:    Cholesterol:  September 29, 2022.  Colonoscopy: June 2, 2015 per patient.  GYN Exam: January 22, 2024 pap smear, HPV with GYN Dr. Cadence shelley.  Mammogram: January 22, 2024 with GYN Dr. Cadence shelley.   Eye Exam: March 16, 2021 at [facility].  HIVAb:  February 26, 2022.  HCVAb:  February 26, 2022.  PPD: Negative in the past.      Immunizations:    Tdap: > 10 years ago per patient. She declines.  Flu shot:  February 13, 2023.  She declines at this time  Pneumovax: Never. She declines.         Covid-19 (Pfizer) vaccine: July 7, 2021, July 28, 2021, February 13, 2023.                            ROS:  GENERAL: Denies fever, chills, fatigue or unusual weight change. Appetite normal. Exercises once per week by walking 30 minutes each time but limited due to pain at knees and feet  (plantar fasciitis).  does not. Monitors diet (rarely). Weight 157.7 kg (347 lb 12.4 oz) at March 23, 2021 visit. Saw Dr. Jf Ortiz with [facility] for possible weight loss surgery with last visit on February 23, 2024 for severe obesity, morbid obesity with BMI of 50.0-59.9, adult.  SKIN: Denies rashes, itching, changes in mole, color or texture of skin or easy bruising  HEAD:  Denies recent head trauma or headache. Reports follows with neurologist Dr. Linda Mai for migraine treatment and surveillance with last visit on August 15, 2023 with follow-up scheduled for March 2024.  EYES: Denies change in vision, pain, diplopia, redness or discharge.  EARS: Denies ear pain, discharge, vertigo or decreased hearing.  NOSE: Denies loss of smell, epistaxis or rhinitis.  MOUTH & THROAT: Denies hoarseness or change in voice. Denies excessive gum bleeding or mouth sores.  Denies sore throat.  NODES: Denies swollen glands.  CHEST: Denies GALLAGHER, wheezing, cough, or sputum production. Saw pulmonologist Dr. Umang Hernandez for KELLY on CPAP with last visit on July 23, 2021.   CARDIOVASCULAR: Denies chest pain, PND, orthopnea or reduced exercise tolerance.  Denies palpitations. Reports does not perform home blood pressure checks.   ABDOMEN: Denies diarrhea, constipation, nausea, vomiting, abdominal pain, or blood in stool.  URINARY: Denies flank pain, dysuria or hematuria.  GENITOURINARY: Denies flank pain, dysuria, frequency or hematuria. No change in menses. Does perform monthly breast exams does.  ENDOCRINE: Denies diabetes, thyroid, cholesterol problems but reports history of insulin resistance. Performs home glucose checks does not. Reports no longer follows with weight management provider at [facility] but continues she continues to take Rybelsus for IRS, weight issue.  HEME/LYMPH: Denies bleeding problems.  PERIPHERAL VASCULAR:Denies claudication or cyanosis.  MUSCULOSKELETAL: Denies joint  "stiffness, pain or swelling. Denies edema.  Reports occasional pain in knees and feet.  NEUROLOGIC: Denies history of seizures, tremors, paralysis, alteration of gait or coordination.  PSYCHIATRIC: Denies mood swings, depression, anxiety, homicidal or suicidal thoughts. Denies sleep problems.     PE: Blood Pressure (Abnormal) 140/80   Pulse 96 Comment: Rechecked by Dr. Umaña.  Temperature 97 °F (36.1 °C) (Tympanic)   Height 5' 5" (1.651 m)   Weight (Abnormal) 169.9 kg (374 lb 9 oz)   Last Menstrual Period 02/18/2024 Comment: Monthly  Oxygen Saturation 98%   Body Mass Index 62.33 kg/m²   APPEARANCE:  Well nourished, well developed female, pleasant and obese, alert and oriented in no acute distress.    HEAD: Nontender. Full range of motion.  EYES: PERRL, conjunctiva pink, lids no edema.   EARS: External canal patent with wax noted. No swelling or redness. TM's shiny and clear.  NOSE: Mucosa and turbinates pink, not swollen. No discharge. Nontender sinuses.  THROAT: No pharyngeal erythema or exudate. No stridor.   NECK: Supple, no mass, thyroid not enlarged.  NODES: No cervical lymph node enlargement.  CHEST: Normal respiratory effort. Lungs clear to auscultation.  CARDIOVASCULAR: Normal S1, S2. No rubs, murmurs or gallops. PMI not displaced. No carotid bruit. Pedal pulses palpable bilaterally. No edema.  ABDOMEN: Bowel sounds present. Not distended. Soft. No tenderness, masses or organomegaly.  BREAST EXAM: Not examined.  PELVIC EXAM: Not examined.  RECTAL EXAM: Not examined.  MUSCULOSKELETAL: No joint deformities or stiffness. Non tender right knee today.  She is ambulatory without problems.  SKIN: No rashes or suspicious lesions, normal color and turgor.  NEUROLOGIC:   Cranial Nerves: II-XII grossly intact.   DTR's: Knees, Ankles 2+ and equal bilaterally. Gait & Posture: Normal gait and fine motion.  PSYCHIATRIC:Patient alert, oriented x 3. Mood/Affect normal without acute anxiety or depression noted. " Judgment/insight good as she makes appropriate decisions during today's exam.    ASSESSMENT:    ICD-10-CM ICD-9-CM    1. Annual physical exam  Z00.00 V70.0 CBC Auto Differential      TSH      Comprehensive Metabolic Panel      Lipid Panel      Urinalysis      Insulin, Random      Hemoglobin A1C      Ferritin      Vitamin D      2. Essential hypertension  I10 401.9 labetaloL (NORMODYNE) 100 MG tablet      hydroCHLOROthiazide (HYDRODIURIL) 12.5 MG Tab      3. Insulin resistance syndrome  E88.810 277.7 RYBELSUS 7 mg tablet      4. Prediabetes  R73.03 790.29 RYBELSUS 7 mg tablet      5. KELLY on CPAP  G47.33 327.23       6. Vitamin D deficiency  E55.9 268.9       7. Iron deficiency anemia, unspecified iron deficiency anemia type  D50.9 280.9       8. Migraine without aura and responsive to treatment  G43.009 346.10       9. Morbid obesity with BMI of 60.0-69.9, adult  E66.01 278.01 RYBELSUS 7 mg tablet    Z68.44 V85.44             PLAN:  1. Age-appropriate counseling-appropriate low-sodium, low-cholesterol, low carbohydrate diet and exercise daily, monthly breast self exam, annual wellness examination.   2. Patient advised to call for results.  3. Continue current medications.  4. Prescription refills as noted above.  5. Keep follow up with specialists.  6. Follow up in about 6 months (around 8/27/2024) for hypertension, IRS, prediabetes follow up.

## 2024-04-03 ENCOUNTER — PATIENT MESSAGE (OUTPATIENT)
Dept: PULMONOLOGY | Facility: CLINIC | Age: 44
End: 2024-04-03
Payer: COMMERCIAL

## 2024-04-10 ENCOUNTER — TELEPHONE (OUTPATIENT)
Dept: INTERNAL MEDICINE | Facility: CLINIC | Age: 44
End: 2024-04-10
Payer: COMMERCIAL

## 2024-04-10 NOTE — TELEPHONE ENCOUNTER
Patient contacted in regards to us needing a current BP reading. Patient informed that she has an upcoming appt and will have her BP checked. She stated that she will send us her reading through my chart.

## 2024-04-16 ENCOUNTER — TELEPHONE (OUTPATIENT)
Dept: PHARMACY | Facility: CLINIC | Age: 44
End: 2024-04-16
Payer: COMMERCIAL

## 2024-05-30 ENCOUNTER — PATIENT MESSAGE (OUTPATIENT)
Dept: FAMILY MEDICINE | Facility: CLINIC | Age: 44
End: 2024-05-30
Payer: COMMERCIAL

## 2024-07-25 ENCOUNTER — TELEPHONE (OUTPATIENT)
Dept: FAMILY MEDICINE | Facility: CLINIC | Age: 44
End: 2024-07-25
Payer: COMMERCIAL

## 2024-07-25 NOTE — TELEPHONE ENCOUNTER
LOV: 2/27/24    Pt states her insurance stopped covering her Rybelsus, and she has not been taking any medication for her insulin resistance since then. Pt states she has had recent lab work performed at [facility] and is going to have it sent over for your review.

## 2024-07-26 ENCOUNTER — PATIENT MESSAGE (OUTPATIENT)
Dept: FAMILY MEDICINE | Facility: CLINIC | Age: 44
End: 2024-07-26
Payer: COMMERCIAL

## 2024-07-26 NOTE — TELEPHONE ENCOUNTER
Just spoke with her and she said [facility] She forgot to send them.

## 2024-07-29 ENCOUNTER — TELEPHONE (OUTPATIENT)
Dept: FAMILY MEDICINE | Facility: CLINIC | Age: 44
End: 2024-07-29
Payer: COMMERCIAL

## 2024-07-29 NOTE — TELEPHONE ENCOUNTER
----- Message from Linda De Guzman sent at 7/29/2024 12:29 PM CDT -----  Contact: Marie with [facility] phone [phone]  Calling to report the patient's blood pressure in June got has high has 172/101. Please advise. Thanks.

## 2024-08-02 ENCOUNTER — TELEPHONE (OUTPATIENT)
Dept: FAMILY MEDICINE | Facility: CLINIC | Age: 44
End: 2024-08-02
Payer: COMMERCIAL

## 2024-08-02 NOTE — TELEPHONE ENCOUNTER
Pt advised and understood results. Pt states her insurance no longer wants to cover Rybelsus, and she says she has tried Metformin in the past.     08/01/24 BP: 148/97  07/31/24 BP: 151/95

## 2024-08-02 NOTE — TELEPHONE ENCOUNTER
Advise pt I received/reviewed 6/10/2024 lab results - within acceptable range except A1c at 5.9 (prediabetes).  As she is not diabetic, I assume her insurance no longer wants to pay for Rybelsus.  Would she like to try Metformin for prediabetes?     What are her blood pressure readings now?

## 2024-08-14 ENCOUNTER — TELEPHONE (OUTPATIENT)
Dept: FAMILY MEDICINE | Facility: CLINIC | Age: 44
End: 2024-08-14
Payer: COMMERCIAL

## 2024-08-14 NOTE — TELEPHONE ENCOUNTER
Advise patient there is no other alternative medication option for Insulin Resistance that is covered by insurance or that is recommended without causing unwanted side effects.  I recommend she continue with diet and exercise. Thanks.

## 2024-08-14 NOTE — TELEPHONE ENCOUNTER
Spoke with pt via telephone and she is concerned about what to take for Insulin resistance. Pt states insurance is no longer covering Rybelsus and she does not want to try Metformin again. Pt is requesting an alternative option.

## 2024-09-05 ENCOUNTER — OFFICE VISIT (OUTPATIENT)
Dept: FAMILY MEDICINE | Facility: CLINIC | Age: 44
End: 2024-09-05
Attending: FAMILY MEDICINE
Payer: COMMERCIAL

## 2024-09-05 VITALS
DIASTOLIC BLOOD PRESSURE: 72 MMHG | OXYGEN SATURATION: 99 % | HEART RATE: 99 BPM | BODY MASS INDEX: 48.82 KG/M2 | SYSTOLIC BLOOD PRESSURE: 138 MMHG | HEIGHT: 65 IN | TEMPERATURE: 97 F | WEIGHT: 293 LBS

## 2024-09-05 DIAGNOSIS — R73.03 PREDIABETES: ICD-10-CM

## 2024-09-05 DIAGNOSIS — D50.9 IRON DEFICIENCY ANEMIA, UNSPECIFIED IRON DEFICIENCY ANEMIA TYPE: ICD-10-CM

## 2024-09-05 DIAGNOSIS — E88.810 INSULIN RESISTANCE SYNDROME: ICD-10-CM

## 2024-09-05 DIAGNOSIS — L85.3 DRY SKIN: ICD-10-CM

## 2024-09-05 DIAGNOSIS — B35.1 ONYCHOMYCOSIS OF NAIL OF DIGIT OF HAND: ICD-10-CM

## 2024-09-05 DIAGNOSIS — I10 ESSENTIAL HYPERTENSION: ICD-10-CM

## 2024-09-05 DIAGNOSIS — E66.01 MORBID OBESITY WITH BMI OF 60.0-69.9, ADULT: ICD-10-CM

## 2024-09-05 PROCEDURE — G2211 COMPLEX E/M VISIT ADD ON: HCPCS | Mod: S$GLB,,, | Performed by: FAMILY MEDICINE

## 2024-09-05 PROCEDURE — 3008F BODY MASS INDEX DOCD: CPT | Mod: CPTII,S$GLB,, | Performed by: FAMILY MEDICINE

## 2024-09-05 PROCEDURE — 99999 PR PBB SHADOW E&M-EST. PATIENT-LVL IV: CPT | Mod: PBBFAC,,, | Performed by: FAMILY MEDICINE

## 2024-09-05 PROCEDURE — 3078F DIAST BP <80 MM HG: CPT | Mod: CPTII,S$GLB,, | Performed by: FAMILY MEDICINE

## 2024-09-05 PROCEDURE — 1160F RVW MEDS BY RX/DR IN RCRD: CPT | Mod: CPTII,S$GLB,, | Performed by: FAMILY MEDICINE

## 2024-09-05 PROCEDURE — 3044F HG A1C LEVEL LT 7.0%: CPT | Mod: CPTII,S$GLB,, | Performed by: FAMILY MEDICINE

## 2024-09-05 PROCEDURE — 99214 OFFICE O/P EST MOD 30 MIN: CPT | Mod: S$GLB,,, | Performed by: FAMILY MEDICINE

## 2024-09-05 PROCEDURE — 1159F MED LIST DOCD IN RCRD: CPT | Mod: CPTII,S$GLB,, | Performed by: FAMILY MEDICINE

## 2024-09-05 PROCEDURE — 3075F SYST BP GE 130 - 139MM HG: CPT | Mod: CPTII,S$GLB,, | Performed by: FAMILY MEDICINE

## 2024-09-05 RX ORDER — NORTRIPTYLINE HYDROCHLORIDE 50 MG/1
CAPSULE ORAL
COMMUNITY
Start: 2024-04-01

## 2024-09-05 RX ORDER — FLUCONAZOLE 150 MG/1
TABLET ORAL
Qty: 2 TABLET | Refills: 0 | Status: SHIPPED | OUTPATIENT
Start: 2024-09-05

## 2024-09-05 NOTE — PROGRESS NOTES
Aggie Horton    Chief Complaint   Patient presents with    Hypertension    Pre-diabetes    Insulin Resistance    Follow-up       History of Present Illness:   Ms. Lal comes in today for 6-month hypertension, insulin resistance syndrome, prediabetes follow-up.  She states she is not fasting but has taken blood pressure medication today.  She states she continues to take hydrochlorothiazide 25 mg daily, labetalol 100 mg twice daily for blood pressure control.  She states she performs home blood pressure checks daily using wrist monitor with level of 151/98 on yesterday evening.  She states she exercises by walking 4 times per week, 30 minutes each time and monitors her diet as she follows keto diet.    She states insurance will no longer cover Rybelsus for treatment of prediabetes, insulin resistance as of May 20, 2024.  She states she will reconsider taking metformin at a different dose.  She saw bariatric surgeon on June 25, 2024 and states surgery has been postponed until November 20, 2024 as her iron has been low.    She complains of having dry skin at her legs.  She states BOAZ Shafer with dermatology prescribed triamcinolone acetonide 0.1% ointment to be used on/off over 2 weeks with little help for dryness but much improvement of itching.    She states she usually wears acrylic nails which she states she removed last week and saw greenish tint of her right 3rd and 4th fingernails.  She denies having associated pain.  She states she has been applying over-the-counter antifungal cream and soaking her fingertips in vinegar with little help.    Otherwise, she denies having fever, chills, fatigue, appetite changes; shortness of breath, cough, wheezing; chest pain, palpitations, leg swelling; abdominal pain, nausea, vomiting, diarrhea, constipation; unusual urinary symptoms; polydipsia, polyphagia, polyuria, hot or cold intolerance; back pain; acute visual changes, numbness, headache; anxiety,  depression, homicidal or suicidal thoughts.       Labs:                      WBC                      10.2                02/26/2022                 HGB                      11.8                02/26/2022                 HCT                      39.4                02/26/2022                 PLT                      423                 02/26/2022                 CHOL                     175                 06/10/2024                 TRIG                     83                  06/10/2024                 HDL                      47                  06/10/2024                 ALT                      16                  02/26/2022                 AST                      18                  04/04/2023                 NA                       139                 04/04/2023                 K                        4.1                 04/04/2023                 CL                       104                 04/04/2023                 CREATININE               1.1 (H)             04/04/2023                 BUN                      10                  04/04/2023                 CO2                      19 (L)              02/26/2022                 TSH                      1.370               06/10/2024                 HGBA1C                   5.9 (H)             06/10/2024                LDLCALC                  113 (H)             06/10/2024                  Current Outpatient Medications   Medication Sig    ammonium lactate 12 % Crea Apply topically twice daily    ferrous sulfate (FEOSOL) 325 mg (65 mg iron) Tab tablet Take 325 mg by mouth daily with breakfast.    hydroCHLOROthiazide (HYDRODIURIL) 25 MG tablet Take 1 tablet (25 mg total) by mouth once daily.    hydrocortisone 2.5 % cream     labetaloL (NORMODYNE) 100 MG tablet Take 1 tablet (100 mg total) by mouth 2 (two) times daily.    nortriptyline (PAMELOR) 50 MG capsule Oral for 90 Days    rimegepant (NURTEC) 75 mg odt Take 75 mg by mouth daily as needed.     topiramate (TOPAMAX) 100 MG tablet Take 100 mg by mouth.    triamcinolone acetonide 0.1% (KENALOG) 0.1 % ointment Apply topically 2 (two) times daily.    vitamin D 1000 units Tab Take 185 mg by mouth.       Review of Systems   Constitutional:  Negative for activity change, appetite change, chills, fatigue and fever.        Weight 169.2 kg (373 lb 0.3 oz) at August 22, 2022.   Respiratory:  Negative for cough, shortness of breath and wheezing.    Cardiovascular:  Negative for chest pain, palpitations and leg swelling.        See history of present illness.   Gastrointestinal:  Negative for abdominal pain, constipation, diarrhea, nausea and vomiting.   Endocrine: Negative for cold intolerance, heat intolerance, polydipsia, polyphagia and polyuria.   Genitourinary:  Negative for difficulty urinating.   Musculoskeletal:  Negative for back pain and myalgias.   Skin:  Positive for rash.        See history of present illness.   Neurological:  Negative for numbness and headaches.   Psychiatric/Behavioral:  Negative for dysphoric mood and suicidal ideas. The patient is not nervous/anxious.         Negative for homicidal ideas.       Objective:  Physical Exam  Vitals reviewed.   Constitutional:       General: She is not in acute distress.     Appearance: Normal appearance. She is well-developed. She is obese. She is not ill-appearing, toxic-appearing or diaphoretic.      Comments: Pleasant.   HENT:      Mouth/Throat:      Lips: No lesions.   Neck:      Thyroid: No thyromegaly.   Cardiovascular:      Rate and Rhythm: Normal rate and regular rhythm.      Pulses:           Dorsalis pedis pulses are 3+ on the right side and 3+ on the left side.      Heart sounds: Normal heart sounds. No murmur heard.  Pulmonary:      Effort: Pulmonary effort is normal. No respiratory distress.      Breath sounds: Normal breath sounds. No wheezing or rales.   Abdominal:      General: Bowel sounds are normal. There is no distension.       Palpations: Abdomen is soft. There is no mass.      Tenderness: There is no abdominal tenderness. There is no guarding or rebound.   Musculoskeletal:         General: No swelling or tenderness. Normal range of motion.      Cervical back: Normal range of motion and neck supple. No tenderness.      Comments: She is ambulatory without problems. Non tender knees with full range of motion noted.   Feet:      Right foot:      Protective Sensation: 5 sites tested.  5 sites sensed.      Skin integrity: No ulcer or skin breakdown.      Left foot:      Protective Sensation: 5 sites tested.  5 sites sensed.      Skin integrity: No ulcer or skin breakdown.   Lymphadenopathy:      Cervical: No cervical adenopathy.   Skin:     Comments: Dry skin at both lower legs.  Greenish tint at nail of right 3rd and 4th fingers.   Neurological:      General: No focal deficit present.      Mental Status: She is alert and oriented to person, place, and time.   Psychiatric:         Mood and Affect: Mood normal.         Behavior: Behavior normal.         Thought Content: Thought content normal.         Judgment: Judgment normal.         ASSESSMENT:  1. Essential hypertension    2. Insulin resistance syndrome    3. Prediabetes    4. Iron deficiency anemia, unspecified iron deficiency anemia type    5. Onychomycosis of nail of digit of hand    6. Dry skin    7. Morbid obesity with BMI of 60.0-69.9, adult        PLAN:  Aggie was seen today for hypertension, pre-diabetes, insulin resistance and follow-up.    Diagnoses and all orders for this visit:    Essential hypertension    Insulin resistance syndrome    Prediabetes    Iron deficiency anemia, unspecified iron deficiency anemia type    Onychomycosis of nail of digit of hand  -     fluconazole (DIFLUCAN) 150 MG Tab; Take 1 pill by mouth weekly for 2 weeks as directed.    Dry skin    Morbid obesity with BMI of 60.0-69.9, adult      No labs today.  Continue current medications, follow low sodium,  low cholesterol, low carb diet, daily walks.  Okay to use/apply Ammonium lactate to skin of legs.  Diflucan 150 mg weekly x 2 weeks; medication precautions discussed with patient.  Keep follow up with specialists.  Flu shot this fall.  Follow up in about 25 weeks (around 2/27/2025) for physical.

## 2024-11-12 DIAGNOSIS — I10 ESSENTIAL HYPERTENSION: ICD-10-CM

## 2024-11-12 RX ORDER — HYDROCHLOROTHIAZIDE 25 MG/1
25 TABLET ORAL
Qty: 90 TABLET | Refills: 0 | Status: SHIPPED | OUTPATIENT
Start: 2024-11-12

## 2024-11-12 NOTE — TELEPHONE ENCOUNTER
Care Due:                  Date            Visit Type   Department     Provider  --------------------------------------------------------------------------------                                EP -                              PRIMARY      [facility]  Last Visit: 09-      CARE ([facility])   REINALDO Umaña                              EP -                              PRIMARY      [facility]  Next Visit: 03-      CARE ([facility])   MEDICINE       Leslie Umaña                                                            Last  Test          Frequency    Reason                     Performed    Due Date  --------------------------------------------------------------------------------    CMP.........  12 months..  hydroCHLOROthiazide......  04- 03-    [facility] Embedded Care Due Messages. Reference number: [id number].   11/12/2024 12:30:49 AM CST

## 2024-11-12 NOTE — TELEPHONE ENCOUNTER
Refill Routing Note   Medication(s) are not appropriate for processing by [facility] for the following reason(s):        Required labs outdated    [facility] action(s):  Defer     Requires labs : Yes             Appointments  past 12m or future 3m with PCP    Date Provider   Last Visit   9/5/2024 Leslie Umaña MD   Next Visit   3/6/2025 Leslie Umaña MD   ED visits in past 90 days: 0        Note composed:2:22 AM 11/12/2024

## 2025-02-18 DIAGNOSIS — I10 ESSENTIAL HYPERTENSION: ICD-10-CM

## 2025-02-18 RX ORDER — HYDROCHLOROTHIAZIDE 25 MG/1
25 TABLET ORAL
Qty: 90 TABLET | Refills: 0 | Status: SHIPPED | OUTPATIENT
Start: 2025-02-18

## 2025-02-18 NOTE — TELEPHONE ENCOUNTER
Care Due:                  Date            Visit Type   Department     Provider  --------------------------------------------------------------------------------                                EP -                              PRIMARY      [facility]  Last Visit: 09-      CARE ([facility])   MEDICINE       Leslie Umaña  Next Visit: None Scheduled  None         None Found                                                            Last  Test          Frequency    Reason                     Performed    Due Date  --------------------------------------------------------------------------------    CMP.........  12 months..  hydroCHLOROthiazide......  04- 03-    [facility] [place] Embedded Care Due Messages. Reference number: [id number].   2/18/2025 12:17:59 AM CST

## 2025-02-18 NOTE — TELEPHONE ENCOUNTER
Refill Routing Note   Medication(s) are not appropriate for processing by [facility] for the following reason(s):        Required labs outdated  Required vitals abnormal    [facility] action(s):  Defer   Requires labs : Yes             Appointments  past 12m or future 3m with PCP    Date Provider   Last Visit   9/5/2024 Leslie Umaña MD   Next Visit   3/6/2025 Leslie Umaña MD   ED visits in past 90 days: 0        Note composed:11:58 AM 02/18/2025

## 2025-03-06 ENCOUNTER — OFFICE VISIT (OUTPATIENT)
Dept: FAMILY MEDICINE | Facility: CLINIC | Age: 45
End: 2025-03-06
Attending: FAMILY MEDICINE
Payer: COMMERCIAL

## 2025-03-06 VITALS
HEIGHT: 65 IN | BODY MASS INDEX: 48.82 KG/M2 | SYSTOLIC BLOOD PRESSURE: 134 MMHG | TEMPERATURE: 98 F | HEART RATE: 93 BPM | DIASTOLIC BLOOD PRESSURE: 86 MMHG | OXYGEN SATURATION: 98 % | WEIGHT: 293 LBS | RESPIRATION RATE: 18 BRPM

## 2025-03-06 DIAGNOSIS — G47.33 OSA ON CPAP: ICD-10-CM

## 2025-03-06 DIAGNOSIS — E55.9 VITAMIN D DEFICIENCY: ICD-10-CM

## 2025-03-06 DIAGNOSIS — G43.009 MIGRAINE WITHOUT AURA AND RESPONSIVE TO TREATMENT: ICD-10-CM

## 2025-03-06 DIAGNOSIS — E88.810 INSULIN RESISTANCE SYNDROME: ICD-10-CM

## 2025-03-06 DIAGNOSIS — E66.01 MORBID OBESITY WITH BMI OF 50.0-59.9, ADULT: ICD-10-CM

## 2025-03-06 DIAGNOSIS — Z00.00 ANNUAL PHYSICAL EXAM: ICD-10-CM

## 2025-03-06 DIAGNOSIS — I10 ESSENTIAL HYPERTENSION: ICD-10-CM

## 2025-03-06 DIAGNOSIS — Z23 NEED FOR VACCINATION: ICD-10-CM

## 2025-03-06 DIAGNOSIS — R73.03 PREDIABETES: ICD-10-CM

## 2025-03-06 PROCEDURE — 3079F DIAST BP 80-89 MM HG: CPT | Mod: CPTII,S$GLB,, | Performed by: FAMILY MEDICINE

## 2025-03-06 PROCEDURE — 1160F RVW MEDS BY RX/DR IN RCRD: CPT | Mod: CPTII,S$GLB,, | Performed by: FAMILY MEDICINE

## 2025-03-06 PROCEDURE — 3008F BODY MASS INDEX DOCD: CPT | Mod: CPTII,S$GLB,, | Performed by: FAMILY MEDICINE

## 2025-03-06 PROCEDURE — 99999 PR PBB SHADOW E&M-EST. PATIENT-LVL V: CPT | Mod: PBBFAC,,, | Performed by: FAMILY MEDICINE

## 2025-03-06 PROCEDURE — 99396 PREV VISIT EST AGE 40-64: CPT | Mod: 25,S$GLB,, | Performed by: FAMILY MEDICINE

## 2025-03-06 PROCEDURE — 90656 IIV3 VACC NO PRSV 0.5 ML IM: CPT | Mod: S$GLB,,, | Performed by: FAMILY MEDICINE

## 2025-03-06 PROCEDURE — 3075F SYST BP GE 130 - 139MM HG: CPT | Mod: CPTII,S$GLB,, | Performed by: FAMILY MEDICINE

## 2025-03-06 PROCEDURE — 90471 IMMUNIZATION ADMIN: CPT | Mod: S$GLB,,, | Performed by: FAMILY MEDICINE

## 2025-03-06 PROCEDURE — 1159F MED LIST DOCD IN RCRD: CPT | Mod: CPTII,S$GLB,, | Performed by: FAMILY MEDICINE

## 2025-03-06 PROCEDURE — 3044F HG A1C LEVEL LT 7.0%: CPT | Mod: CPTII,S$GLB,, | Performed by: FAMILY MEDICINE

## 2025-03-06 RX ORDER — HYDROCHLOROTHIAZIDE 25 MG/1
25 TABLET ORAL DAILY
Qty: 90 TABLET | Refills: 3 | Status: SHIPPED | OUTPATIENT
Start: 2025-03-06

## 2025-03-06 RX ORDER — LABETALOL 100 MG/1
100 TABLET, FILM COATED ORAL 2 TIMES DAILY
Qty: 180 TABLET | Refills: 3 | Status: SHIPPED | OUTPATIENT
Start: 2025-03-06

## 2025-03-06 NOTE — PROGRESS NOTES
HISTORY OF PRESENT ILLNESS:  Ms. Horton comes in today nonfasting and without taken medication for annual wellness examination.     END OF LIFE DECISION: She does not have a living will. She does desire life support.    Current Outpatient Medications   Medication Sig    ammonium lactate 12 % Crea Apply topically twice daily    ferrous sulfate (FEOSOL) 325 mg (65 mg iron) Tab tablet Take 325 mg by mouth daily with breakfast.    hydroCHLOROthiazide (HYDRODIURIL) 25 MG tablet TAKE 1 TABLET BY MOUTH EVERY DAY    hydrocortisone 2.5 % cream     labetaloL (NORMODYNE) 100 MG tablet Take 1 tablet (100 mg total) by mouth 2 (two) times daily.    nortriptyline (PAMELOR) 50 MG capsule Oral for 90 Days    nortriptyline (PAMELOR) 50 MG capsule Take 50 mg by mouth.    rimegepant (NURTEC) 75 mg odt Take 75 mg by mouth daily as needed.    semaglutide (OZEMPIC) 0.25 mg or 0.5 mg(2 mg/1.5 mL) pen injector Inject 0.5 mg into the skin every 7 days.    topiramate (TOPAMAX) 100 MG tablet Take 1 tablet by mouth every evening.    triamcinolone acetonide 0.1% (KENALOG) 0.1 % ointment Apply topically 2 (two) times daily.    vitamin D 1000 units Tab Take 185 mg by mouth.     SCREENINGS:    Cholesterol:  Mimi 10, 2024.  Colonoscopy: June 2, 2015 per patient.  GYN Exam: January 27, 2025 pap smear, HPV with GYN Dr. Cadence shellye.  Mammogram:  January 27, 2025 with GYN Dr. Cadence shelley.   Eye Exam: March 16, 2021 at [facility].  HIVAb:  February 26, 2022.  HCVAb:  February 26, 2022.  PPD: Negative in the past.      Immunizations:    Tdap: > 10 years ago per patient. She declines.  Flu shot:  February 13, 2023.  She desires today.  Pneumovax: Never. She declines.         Covid-19 (Pfizer) vaccine: July 7, 2021, July 28, 2021, February 13, 2023.                            ROS:  GENERAL: Denies fever, chills, fatigue or unusual weight change. Appetite normal. Exercises 4 times per week, 45 to 90 minutes each time at [employer].  Monitors  diet (tries). Weight 170.1 kg (375 lb) at September 5, 2024 visit. Saw Dr. Favio Herring with [facility] on February 26, 2025 for prediabetes, primary hypertension;, migraine without status migrainosus, not intractable, unspecified migraine type, class 3 severe obesity due to excess calories with serious comorbidity and body mass index (BMI) of 50.0 to 59.9 in adult and weight management with 6-week follow up advised.  SKIN: Denies rashes, itching, changes in mole, color or texture of skin or easy bruising  HEAD:  Denies recent head trauma or headache. Reports follows with neurologist Dr. Linda Mai for migraine treatment and surveillance with last visit on October 1, 2024.  EYES: Denies change in vision, pain, diplopia, redness or discharge.  EARS: Denies ear pain, discharge, vertigo or decreased hearing.  NOSE: Denies loss of smell, epistaxis or rhinitis.  MOUTH & THROAT: Denies hoarseness or change in voice. Denies excessive gum bleeding or mouth sores.  Denies sore throat.  NODES: Denies swollen glands.  CHEST: Denies GALLAGHER, wheezing, cough, or sputum production. Saw pulmonologist Dr. Umang Hernandez for KELLY on CPAP with last visit on July 23, 2021.   CARDIOVASCULAR: Denies chest pain, PND, orthopnea or reduced exercise tolerance.  Denies palpitations. Reports performs home blood pressure checks every other day with levels ranging 140-150/80's.  ABDOMEN: Denies diarrhea, constipation, nausea, vomiting, abdominal pain, or blood in stool.  URINARY: Denies flank pain, dysuria or hematuria.  GENITOURINARY: Denies flank pain, dysuria, frequency or hematuria. No change in menses. Does perform monthly breast exams does.  ENDOCRINE: Denies diabetes, thyroid, cholesterol problems but reports history of insulin resistance. Performs home glucose checks does not. Saw Dr. Favio Herring with [facility] on February 26, 2025 for prediabetes, primary hypertension, migraine  "without status migrainosus, not intractable, unspecified migraine type, class 3 severe obesity due to excess calories with serious comorbidity and body mass index (BMI) of 50.0 to 59.9 in adult and weight management with 6-week follow up advised. Reports Dr. Herring prescribes Ozempic.  HEME/LYMPH: Denies bleeding problems.  PERIPHERAL VASCULAR:Denies claudication or cyanosis.  MUSCULOSKELETAL: Denies joint stiffness, pain or swelling. Denies edema.  Reports occasional pain in knees and feet but much better now.  NEUROLOGIC: Denies history of seizures, tremors, paralysis, alteration of gait or coordination.  PSYCHIATRIC: Denies mood swings, depression, anxiety, homicidal or suicidal thoughts. Denies sleep problems.     PE: /86   Pulse 93   Temp 97.6 °F (36.4 °C) (Tympanic)   Resp 18   Ht 5' 5" (1.651 m)   Wt (!) 162.2 kg (357 lb 9.4 oz)   LMP 02/20/2025   SpO2 98%   BMI 59.51 kg/m²   APPEARANCE:  Well nourished, well developed female, pleasant and obese, alert and oriented in no acute distress.    HEAD: Nontender. Full range of motion.  EYES: PERRL, conjunctiva pink, lids no edema.   EARS: External canal patent with wax noted. No swelling or redness. TM's shiny and clear.  NOSE: Mucosa and turbinates pink, not swollen. No discharge. Nontender sinuses.  THROAT: No pharyngeal erythema or exudate. No stridor.   NECK: Supple, no mass, thyroid not enlarged.  NODES: No cervical lymph node enlargement.  CHEST: Normal respiratory effort. Lungs clear to auscultation.  CARDIOVASCULAR: Normal S1, S2. No rubs, murmurs or gallops. PMI not displaced. No carotid bruit. Pedal pulses palpable bilaterally. No edema.  ABDOMEN: Bowel sounds present. Not distended. Soft. No tenderness, masses or organomegaly.  BREAST EXAM: Not examined.  PELVIC EXAM: Not examined.  RECTAL EXAM: Not examined.  MUSCULOSKELETAL: No joint deformities or stiffness. Non tender knee joints today.  She is ambulatory without problems.  SKIN: No " rashes or suspicious lesions, normal color and turgor.  NEUROLOGIC:   Cranial Nerves: II-XII grossly intact.   DTR's: Knees, Ankles 2+ and equal bilaterally. Gait & Posture: Normal gait and fine motion.  PSYCHIATRIC:Patient alert, oriented x 3. Mood/Affect normal without acute anxiety or depression noted. Judgment/insight good as she makes appropriate decisions during today's exam.    ASSESSMENT:    ICD-10-CM ICD-9-CM    1. Annual physical exam  Z00.00 V70.0 CBC Auto Differential      Lipid Panel      Vitamin D      2. Essential hypertension - stable on medication I10 401.9 labetaloL (NORMODYNE) 100 MG tablet      hydroCHLOROthiazide (HYDRODIURIL) 25 MG tablet      3. Prediabetes - on medication R73.03 790.29       4. Insulin resistance syndrome - on medication E88.810 277.7       5. Vitamin D deficiency  E55.9 268.9       6. KELLY on CPAP - stable G47.33 327.23       7. Migraine without aura and responsive to treatment - stable and managed by neurology G43.009 346.10       8. Morbid obesity with BMI of 50.0-59.9, adult - managed with diet and exercise E66.01 278.01     Z68.43 V85.43       9. Need for vaccination  Z23 V05.9 Influenza - Trivalent - PF (ADULT)        PLAN:  1. Age-appropriate counseling-appropriate low-sodium, low-cholesterol, low carbohydrate diet and exercise daily, monthly breast self exam, annual wellness examination.   2. Patient advised to call for results.  3. Continue current medications.  4. Prescription refills as noted above.  5. Keep follow up with specialists.  6. Follow up in about 6 months (around 9/6/2025) for hypertension follow up.